# Patient Record
Sex: FEMALE | Race: WHITE | URBAN - METROPOLITAN AREA
[De-identification: names, ages, dates, MRNs, and addresses within clinical notes are randomized per-mention and may not be internally consistent; named-entity substitution may affect disease eponyms.]

---

## 2022-09-22 ENCOUNTER — OFFICE VISIT (OUTPATIENT)
Dept: OBGYN CLINIC | Facility: CLINIC | Age: 32
End: 2022-09-22

## 2022-09-22 VITALS — WEIGHT: 227 LBS | DIASTOLIC BLOOD PRESSURE: 72 MMHG | HEART RATE: 104 BPM | SYSTOLIC BLOOD PRESSURE: 116 MMHG

## 2022-09-22 DIAGNOSIS — Z31.69 PRE-CONCEPTION COUNSELING: ICD-10-CM

## 2022-09-22 DIAGNOSIS — Z01.419 WELL WOMAN EXAM WITH ROUTINE GYNECOLOGICAL EXAM: Primary | ICD-10-CM

## 2022-09-22 DIAGNOSIS — Z12.4 CERVICAL CANCER SCREENING: ICD-10-CM

## 2022-09-22 PROCEDURE — 3074F SYST BP LT 130 MM HG: CPT | Performed by: NURSE PRACTITIONER

## 2022-09-22 PROCEDURE — 3078F DIAST BP <80 MM HG: CPT | Performed by: NURSE PRACTITIONER

## 2022-09-22 PROCEDURE — 99385 PREV VISIT NEW AGE 18-39: CPT | Performed by: NURSE PRACTITIONER

## 2022-09-22 NOTE — PROGRESS NOTES
Here for new gynecology visit. 32year old G 0 P 0. No LMP recorded. Helio Muller Here for Annual Gynecologic Exam. Has been attempting conception since 2022. Menses Q 28 days for 4-5 days. Last pap smear was in 2018 and it was normal.  No hx of abnormal pap smears. OB Hx: neg. Family gyn hx: neg. Family breast hx:  neg. History reviewed. No pertinent past medical history. History reviewed. No pertinent surgical history. No current outpatient medications on file prior to visit. No current facility-administered medications on file prior to visit. OB History    Para Term  AB Living   0 0 0 0 0 0   SAB IAB Ectopic Multiple Live Births   0 0 0 0 0     ROS:    General:  No wt loss, wt gain, appetite changes. /72   Pulse 104   Wt 227 lb (103 kg)     NECK:  Thyroid normal size without nodules. No adenopathy. LUNGS:  Clear to auscultation. COR; Regular rate and rhythm. BREASTS:  symmetrical in shape. No masses, tenderness, secretions, or adenopathy. ABDOMEN:  Soft and non tender. No organomegaly. No inguinal adenopathy. VULVA: No lesions or erythema. VAGINA:  No lesions or abnormal discharge. CERVIX:  No lesions or CMT. Pap smear done with HPV. UTERUS:  anteflexed and normal size. ADNEXA:  No pain or masses. IMP/PLAN:   1. Well woman exam with routine gynecological exam  Regular self breast exams recommended    2. Pre-conception counseling  Advised continued timing of coitus with OPK  SO can obtain semen analysis any time  Call with positive HCG right away    3. Cervical cancer screening  - THINPREP TIS PAP AND HPV MRNA E6/E7 REFLEX HPV 16,18/45    See in 1 year/ prn.

## 2022-09-27 LAB — HPV MRNA E6/E7: NOT DETECTED

## 2022-12-08 ENCOUNTER — OFFICE VISIT (OUTPATIENT)
Dept: OBGYN CLINIC | Facility: CLINIC | Age: 32
End: 2022-12-08
Payer: COMMERCIAL

## 2022-12-08 ENCOUNTER — ULTRASOUND ENCOUNTER (OUTPATIENT)
Dept: OBGYN CLINIC | Facility: CLINIC | Age: 32
End: 2022-12-08
Payer: COMMERCIAL

## 2022-12-08 VITALS
SYSTOLIC BLOOD PRESSURE: 112 MMHG | HEIGHT: 71 IN | HEART RATE: 85 BPM | WEIGHT: 223.38 LBS | BODY MASS INDEX: 31.27 KG/M2 | DIASTOLIC BLOOD PRESSURE: 68 MMHG

## 2022-12-08 DIAGNOSIS — N91.2 AMENORRHEA: Primary | ICD-10-CM

## 2022-12-08 RX ORDER — DESOGESTREL AND ETHINYL ESTRADIOL 0.15-0.03
1 KIT ORAL DAILY
COMMUNITY
Start: 2021-08-18

## 2022-12-08 RX ORDER — BUSPIRONE HYDROCHLORIDE 10 MG/1
1 TABLET ORAL 2 TIMES DAILY PRN
COMMUNITY
Start: 2021-10-26

## 2022-12-08 NOTE — PROGRESS NOTES
Subjective:  Patient presents complaining of no menses. Positive home pregnancy test.  LMP 10/11/22. No family history of any inheritable diseases or congenital birth defects on either side of family. Objective:  /68   Pulse 85   Ht 71\"   Wt 223 lb 6.4 oz (101.3 kg)   LMP 10/11/2022     Physical Examination:  General appearance: Well dressed, well nourished in no apparent distress  Neurologic/Psychiatric: Alert and oriented to person, place and time, mood normal, affect appropriate    Summary of Ultrasound Findings:  LMP 10/11/22  8w2day by LMP;  1G1AFQ by ultrasound  Viable intrauterine pregnancy  Dates consistent with ultrasound. Final EDC:  7/18/22 by LMP consistent with ultrasound on 12/8/2022  Normal ovaries bilaterally     Assessment/Plan:  Amenorrhea- Normal dating ultrasound  Follow up 3-4* weeks for new OB visit. Prenatal vitamin with 0.4 mg folate, DHA. Optional prenatal screening tests reviewed including cfdna, carrier screen/CF, NT/first trimester screen, Quad/AFP, Level 2 ultrasound, amniocentesis/CVS.  Bleeding precautions provided. Diagnoses and all orders for this visit:    Amenorrhea  -     US PELVIS, ABDOMINAL GYNE EMG ONLY; Future         Return in about 4 weeks (around 1/5/2023) for New OB Visit.

## 2023-01-05 ENCOUNTER — INITIAL PRENATAL (OUTPATIENT)
Dept: OBGYN CLINIC | Facility: CLINIC | Age: 33
End: 2023-01-05
Payer: COMMERCIAL

## 2023-01-05 VITALS
WEIGHT: 220 LBS | SYSTOLIC BLOOD PRESSURE: 110 MMHG | DIASTOLIC BLOOD PRESSURE: 68 MMHG | BODY MASS INDEX: 30.8 KG/M2 | HEART RATE: 85 BPM | HEIGHT: 71 IN

## 2023-01-05 DIAGNOSIS — Z3A.12 12 WEEKS GESTATION OF PREGNANCY: ICD-10-CM

## 2023-01-05 DIAGNOSIS — Z34.01 ENCOUNTER FOR SUPERVISION OF NORMAL FIRST PREGNANCY IN FIRST TRIMESTER: Primary | ICD-10-CM

## 2023-01-05 PROBLEM — Z82.79 FAMILY HISTORY OF DOWNS SYNDROME: Status: ACTIVE | Noted: 2023-01-05

## 2023-01-05 LAB
GLUCOSE (URINE DIPSTICK): NEGATIVE MG/DL
MULTISTIX LOT#: NORMAL NUMERIC
PROTEIN (URINE DIPSTICK): NEGATIVE MG/DL

## 2023-01-05 PROCEDURE — 3074F SYST BP LT 130 MM HG: CPT | Performed by: NURSE PRACTITIONER

## 2023-01-05 PROCEDURE — 3008F BODY MASS INDEX DOCD: CPT | Performed by: NURSE PRACTITIONER

## 2023-01-05 PROCEDURE — 81002 URINALYSIS NONAUTO W/O SCOPE: CPT | Performed by: NURSE PRACTITIONER

## 2023-01-05 PROCEDURE — 3078F DIAST BP <80 MM HG: CPT | Performed by: NURSE PRACTITIONER

## 2023-01-05 NOTE — PROGRESS NOTES
Here for initial prenatal visit with our group. 28year old  at [facility] by LMP. Patient's last menstrual period was 10/11/2022. Last pap smear was 2022 and it was normal.     She has no medical history that poses risk for this pregnancy. Her great uncle had Down's Syndrome, she doesn't know maternal age of his mother. OB History    Para Term  AB Living   1             SAB IAB Ectopic Multiple Live Births                  # Outcome Date GA Lbr Delfin/2nd Weight Sex Delivery Anes PTL Lv   1 Current                ROS:  Reviewed, all wnl    Please see prenatal physical exam tab for initial OB physical exam  US: please see US tab      Prenatal course and care discussed with patient: visits, labs, HIV, AFP, sonograms, GL, GBS, restrictions. Pamphlets given. Genetic counseling done at prior visit regarding Cystic fibrosis and SMA carrier screen, First trimester screen/NT screen, CVS, QS + midtrimester sonogram for markers, amniocentesis. Pt plans to do the Quad screen. A/P:  1.  Encounter for supervision of normal first pregnancy in first trimester  - CHLAMYDIA/GONOCOCCUS, BERNARDINO  - OBSTETRIC PANEL WITH FOURTH GENERATION HIV    NICKI 4 weeks/ prn

## 2023-01-14 LAB
ABSOLUTE BASOPHILS: 13 CELLS/UL (ref 0–200)
ABSOLUTE EOSINOPHILS: 32 CELLS/UL (ref 15–500)
ABSOLUTE LYMPHOCYTES: 1663 CELLS/UL (ref 850–3900)
ABSOLUTE MONOCYTES: 410 CELLS/UL (ref 200–950)
ABSOLUTE NEUTROPHILS: 4183 CELLS/UL (ref 1500–7800)
BASOPHILS: 0.2 %
CHLAMYDIA TRACHOMATIS$RNA, TMA: NOT DETECTED
EOSINOPHILS: 0.5 %
HEMATOCRIT: 37.2 % (ref 35–45)
HEMOGLOBIN: 12.3 G/DL (ref 11.7–15.5)
LYMPHOCYTES: 26.4 %
MCH: 30.7 PG (ref 27–33)
MCHC: 33.1 G/DL (ref 32–36)
MCV: 92.8 FL (ref 80–100)
MONOCYTES: 6.5 %
MPV: 10.5 FL (ref 7.5–12.5)
NEISSERIA GONORRHOEAE$RNA, TMA: NOT DETECTED
NEUTROPHILS: 66.4 %
PLATELET COUNT: 272 THOUSAND/UL (ref 140–400)
RDW: 12.1 % (ref 11–15)
RED BLOOD CELL COUNT: 4.01 MILLION/UL (ref 3.8–5.1)
RUBELLA ANTIBODY (IGG): <0.9 INDEX
WHITE BLOOD CELL COUNT: 6.3 THOUSAND/UL (ref 3.8–10.8)

## 2023-01-16 PROBLEM — O09.899 RUBELLA NON-IMMUNE STATUS, ANTEPARTUM (HCC): Status: ACTIVE | Noted: 2023-01-16

## 2023-01-16 PROBLEM — O09.899 RUBELLA NON-IMMUNE STATUS, ANTEPARTUM: Status: ACTIVE | Noted: 2023-01-16

## 2023-01-16 PROBLEM — Z28.39 RUBELLA NON-IMMUNE STATUS, ANTEPARTUM (HCC): Status: ACTIVE | Noted: 2023-01-16

## 2023-01-16 PROBLEM — Z28.39 RUBELLA NON-IMMUNE STATUS, ANTEPARTUM: Status: ACTIVE | Noted: 2023-01-16

## 2023-02-09 ENCOUNTER — ROUTINE PRENATAL (OUTPATIENT)
Dept: OBGYN CLINIC | Facility: CLINIC | Age: 33
End: 2023-02-09
Payer: COMMERCIAL

## 2023-02-09 VITALS
WEIGHT: 222.38 LBS | BODY MASS INDEX: 31.13 KG/M2 | SYSTOLIC BLOOD PRESSURE: 108 MMHG | DIASTOLIC BLOOD PRESSURE: 70 MMHG | HEIGHT: 71 IN | HEART RATE: 110 BPM

## 2023-02-09 DIAGNOSIS — Z3A.17 17 WEEKS GESTATION OF PREGNANCY: Primary | ICD-10-CM

## 2023-02-09 DIAGNOSIS — Z34.00 SUPERVISION OF NORMAL FIRST PREGNANCY, ANTEPARTUM: ICD-10-CM

## 2023-02-09 PROCEDURE — 3008F BODY MASS INDEX DOCD: CPT | Performed by: NURSE PRACTITIONER

## 2023-02-09 PROCEDURE — 3078F DIAST BP <80 MM HG: CPT | Performed by: NURSE PRACTITIONER

## 2023-02-09 PROCEDURE — 81002 URINALYSIS NONAUTO W/O SCOPE: CPT | Performed by: NURSE PRACTITIONER

## 2023-02-09 PROCEDURE — 3074F SYST BP LT 130 MM HG: CPT | Performed by: NURSE PRACTITIONER

## 2023-02-09 NOTE — PROGRESS NOTES
NICKI  No FM yet  Doing well, some nose bleeds  No complaints.  No LOF/VB/uctx  Genetic testing- Quad screen ordered, to do Hep C with it  Anatomy Scan with NICKI at 20 weeks

## 2023-02-16 ENCOUNTER — TELEPHONE (OUTPATIENT)
Dept: OBGYN CLINIC | Facility: CLINIC | Age: 33
End: 2023-02-16

## 2023-02-16 NOTE — TELEPHONE ENCOUNTER
Patient called with lab orders needing tto be faxed to [facility] at [phone]. She said quad and HIV. She just said they missed one on the last lab.  Please advise    Patient has an appointment at 10:40 am

## 2023-02-17 LAB
AFP MOM: 1.16
AFP, SERUM: 41.8 NG/ML
CALC'D GESTATIONAL AGE: 18.3 WEEKS
CIGARETTE SMOKER: NO
DONOR EGG: NO
ESTRIOL MOM: 1.24
ESTRIOL, FREE: 1.65 NG/ML
HCG MOM: 2.07
HCG, SERUM: 38.46 IU/ML
HX OF NEURAL TUBE DEFECTS: NO
INHIBIN A MOM: 1.47
INHIBIN A, DIMERIC: 185 PG/ML
INSULIN DEPEND DIABETIC: NO
MATERNAL WEIGHT: 223 LBS
NUMBER OF FETUSES: 1
PREGNANCY RESULT OF IVF: NO
PREV PREGNANCY DOWN SYND: YES
REPEAT SPECIMEN: NO

## 2023-02-23 ENCOUNTER — TELEPHONE (OUTPATIENT)
Dept: OBGYN CLINIC | Facility: CLINIC | Age: 33
End: 2023-02-23

## 2023-02-23 NOTE — TELEPHONE ENCOUNTER
Contacted patient. She reports vomiting that started overnight. Thinks it is food poisoning. Has been able to keep clear liquids down this afternoon. No pregnancy concerns. Denies pain, LOF, vaginal bleeding. +FM  Reviewed management- encouraged clear liquids the rest of today. Instructed on BRAT diet and avoiding dairy. Patient states understanding and agrees with plan.

## 2023-03-03 ENCOUNTER — ULTRASOUND ENCOUNTER (OUTPATIENT)
Dept: OBGYN CLINIC | Facility: CLINIC | Age: 33
End: 2023-03-03
Payer: COMMERCIAL

## 2023-03-03 ENCOUNTER — ROUTINE PRENATAL (OUTPATIENT)
Dept: OBGYN CLINIC | Facility: CLINIC | Age: 33
End: 2023-03-03
Payer: COMMERCIAL

## 2023-03-03 VITALS
HEART RATE: 81 BPM | HEIGHT: 71 IN | BODY MASS INDEX: 30.8 KG/M2 | WEIGHT: 220 LBS | SYSTOLIC BLOOD PRESSURE: 108 MMHG | DIASTOLIC BLOOD PRESSURE: 70 MMHG

## 2023-03-03 DIAGNOSIS — Z34.01 ENCOUNTER FOR SUPERVISION OF NORMAL FIRST PREGNANCY IN FIRST TRIMESTER: ICD-10-CM

## 2023-03-03 DIAGNOSIS — Z3A.20 20 WEEKS GESTATION OF PREGNANCY: Primary | ICD-10-CM

## 2023-03-03 PROCEDURE — 3078F DIAST BP <80 MM HG: CPT | Performed by: OBSTETRICS & GYNECOLOGY

## 2023-03-03 PROCEDURE — 3074F SYST BP LT 130 MM HG: CPT | Performed by: OBSTETRICS & GYNECOLOGY

## 2023-03-03 PROCEDURE — 81002 URINALYSIS NONAUTO W/O SCOPE: CPT | Performed by: OBSTETRICS & GYNECOLOGY

## 2023-03-03 PROCEDURE — 3008F BODY MASS INDEX DOCD: CPT | Performed by: OBSTETRICS & GYNECOLOGY

## 2023-03-03 PROCEDURE — 76805 OB US >/= 14 WKS SNGL FETUS: CPT | Performed by: OBSTETRICS & GYNECOLOGY

## 2023-03-03 RX ORDER — PNV NO.95/FERROUS FUM/FOLIC AC 28MG-0.8MG
TABLET ORAL
COMMUNITY

## 2023-03-03 NOTE — PROGRESS NOTES
20w3d seen for routine OB visit. Denies ctx, lof, vb. Reports some subtle FM. Patient Active Problem List:     Encounter for supervision of normal first pregnancy in first trimester     Family history of Downs syndrome     Rubella non-immune status, antepartum       Gen: AAOx3, NAD  Resp: breathing comfortably  Abdomen: gravid, soft, nontender  Ext: nontender, no edema    US: EFW 469Z (60CS), cephalic, no anatomic abnormalities noted, , anterior placenta, normal cord insertion, [place] wnl, CL 4.1cm    Plan:  - reviewed normal anatomy US  - 2T labs ordered  - return precautions reviewed    RTO in 4 week(s).

## 2023-03-21 ENCOUNTER — TELEPHONE (OUTPATIENT)
Dept: OBGYN CLINIC | Facility: CLINIC | Age: 33
End: 2023-03-21

## 2023-03-21 NOTE — TELEPHONE ENCOUNTER
Received breast pump orders from [employer]. [facility] signed and I faxed back.  Copy in plfd

## 2023-03-22 NOTE — TELEPHONE ENCOUNTER
Received orders from [facility] for a backbrace. Dr. Alejandrina Kincaid signed and I faxed back.  Copy in plfd

## 2023-03-30 ENCOUNTER — ROUTINE PRENATAL (OUTPATIENT)
Dept: OBGYN CLINIC | Facility: CLINIC | Age: 33
End: 2023-03-30
Payer: COMMERCIAL

## 2023-03-30 VITALS — SYSTOLIC BLOOD PRESSURE: 112 MMHG | DIASTOLIC BLOOD PRESSURE: 66 MMHG | WEIGHT: 226.38 LBS | BODY MASS INDEX: 32 KG/M2

## 2023-03-30 DIAGNOSIS — Z3A.24 24 WEEKS GESTATION OF PREGNANCY: Primary | ICD-10-CM

## 2023-03-30 DIAGNOSIS — Z34.00 SUPERVISION OF NORMAL FIRST PREGNANCY, ANTEPARTUM: ICD-10-CM

## 2023-03-30 LAB
ABSOLUTE BASOPHILS: 21 CELLS/UL (ref 0–200)
ABSOLUTE EOSINOPHILS: 62 CELLS/UL (ref 15–500)
ABSOLUTE LYMPHOCYTES: 1778 CELLS/UL (ref 850–3900)
ABSOLUTE MONOCYTES: 582 CELLS/UL (ref 200–950)
ABSOLUTE NEUTROPHILS: 7956 CELLS/UL (ref 1500–7800)
BASOPHILS: 0.2 %
EOSINOPHILS: 0.6 %
GLUCOSE (URINE DIPSTICK): NEGATIVE MG/DL
GLUCOSE, GESTATIONAL SCREEN (50G)-130 CUTOFF: 84 MG/DL
HEMATOCRIT: 34.3 % (ref 35–45)
HEMOGLOBIN: 11.4 G/DL (ref 11.7–15.5)
LYMPHOCYTES: 17.1 %
MCH: 30.6 PG (ref 27–33)
MCHC: 33.2 G/DL (ref 32–36)
MCV: 92.2 FL (ref 80–100)
MONOCYTES: 5.6 %
MPV: 10.8 FL (ref 7.5–12.5)
MULTISTIX LOT#: NORMAL NUMERIC
NEUTROPHILS: 76.5 %
PLATELET COUNT: 254 THOUSAND/UL (ref 140–400)
PROTEIN (URINE DIPSTICK): NEGATIVE MG/DL
RDW: 12.4 % (ref 11–15)
RED BLOOD CELL COUNT: 3.72 MILLION/UL (ref 3.8–5.1)
WHITE BLOOD CELL COUNT: 10.4 THOUSAND/UL (ref 3.8–10.8)

## 2023-03-30 PROCEDURE — 81002 URINALYSIS NONAUTO W/O SCOPE: CPT | Performed by: NURSE PRACTITIONER

## 2023-03-30 PROCEDURE — 3078F DIAST BP <80 MM HG: CPT | Performed by: NURSE PRACTITIONER

## 2023-03-30 PROCEDURE — 3074F SYST BP LT 130 MM HG: CPT | Performed by: NURSE PRACTITIONER

## 2023-03-30 NOTE — PROGRESS NOTES
NICKI  Doing well, had a spotting episode that was vaginal last week. It seemed like a lot of blood with wiping but resolved immediately. Advised on stool softeners and fiber/ water. Call with any reoccurrence.    FM is good  RH positive  1 hr glucose was normal  TDAP recommended   RTC 4wks

## 2023-04-28 ENCOUNTER — ROUTINE PRENATAL (OUTPATIENT)
Dept: OBGYN CLINIC | Facility: CLINIC | Age: 33
End: 2023-04-28
Payer: COMMERCIAL

## 2023-04-28 VITALS — SYSTOLIC BLOOD PRESSURE: 104 MMHG | DIASTOLIC BLOOD PRESSURE: 68 MMHG | WEIGHT: 232.25 LBS | BODY MASS INDEX: 32 KG/M2

## 2023-04-28 DIAGNOSIS — Z3A.28 28 WEEKS GESTATION OF PREGNANCY: Primary | ICD-10-CM

## 2023-04-28 DIAGNOSIS — Z34.00 SUPERVISION OF NORMAL FIRST PREGNANCY, ANTEPARTUM: ICD-10-CM

## 2023-04-28 PROCEDURE — 3074F SYST BP LT 130 MM HG: CPT | Performed by: OBSTETRICS & GYNECOLOGY

## 2023-04-28 PROCEDURE — 90471 IMMUNIZATION ADMIN: CPT | Performed by: OBSTETRICS & GYNECOLOGY

## 2023-04-28 PROCEDURE — 81002 URINALYSIS NONAUTO W/O SCOPE: CPT | Performed by: OBSTETRICS & GYNECOLOGY

## 2023-04-28 PROCEDURE — 90715 TDAP VACCINE 7 YRS/> IM: CPT | Performed by: OBSTETRICS & GYNECOLOGY

## 2023-04-28 PROCEDURE — 3078F DIAST BP <80 MM HG: CPT | Performed by: OBSTETRICS & GYNECOLOGY

## 2023-04-28 NOTE — PROGRESS NOTES
NICKI 28w3d    Doing well, +FM  no contractions  no LOF, VB    1. FHT-present  2. PNL:  HIV ordered  3. Mode of delivery:  anticipated   4. Immunizations: TDAP she accepts today.    5. Reviewed birth classes and picking peds    Return in 2 weeks

## 2023-04-28 NOTE — PATIENT INSTRUCTIONS
[employer] [employer] has had significant outbreaks of pertussis (whooping cough) for the last few years. Therefore, the CDC recommends that all pregnant women receive a Tdap vaccine during pregnancy, regardless of whether you have received one previously. The vaccine reduces your chances of ramon the illness, and also provides some natural immunity to your baby for possible exposures after birth. The best time to get the vaccine is between 27 and 36 weeks. Baby caregivers (grandparents, spouse) should also receive the vaccine. Influenza- Pregnant women who develop the flu are more prone to serious complications that can affect both mother and baby. The CDC recommends that all women who will be pregnant during flu season (October to May) receive the flu vaccine (injection only, not nasal spray). The vaccine can be given during any stage of pregnancy. Both vaccines are offered in our office, as well as through many pharmacies and primary care offices. Pregnancy/Nursing and Covid-19 Vaccine   As the [facility]  begins to make progress in administering the Covid-19 vaccine, we understand that our pregnant and breastfeeding patients will have questions about the safety of the Covid-19 vaccine. Keep in mind that information is evolving rapidly and that recommendations can change over time. The CDC, the Society for Maternal Fetal Medicine, and the [facility] of [facility]stetrics & Gynecology now recommend that all pregnant and breastfeeding women consider getting the Covid-19 vaccine, in consultation with their healthcare provider. Factors to consider include the available safety data, risks to pregnant women from Covid-19 infection, and your individual risk for infection and severe disease, based on your risk of exposure and any other medical risk factors that you may have.   A recent study of 36,000 pregnant women confirmed the safety and effectiveness of the vaccine, with no increase risk of miscarriage. Here are some facts to consider when you are making a decision:   [1] Pregnant women are at higher risk for acquiring Covid-19 infection and have a subsequent higher risk for the following:  - severe illness  - adverse pregnancy outcomes including  birth  - hospitalization, ICU admission, need for mechanical ventilation and death  [2] The mRNA vaccines that are currently available do not contain live virus, do not enter the nucleus, do not cause genetic changes, do not alter human DNA, and cannot cause Covid-19 illness. [3] mRNA breaks down quickly, so it's unlikely that any of the mRNA in the vaccine would be able to get into your breast milk or into your baby through the placenta. [4] The antibodies that your body produces in response to the vaccine will be passed to your baby through the placenta and provide some protection for your  baby against Covid-19 infection. [5] Whether you ultimately decide to receive the vaccine or not, do not forget to continue with other preventative measures including frequent hand washing, avoiding crowds, physical distancing and wearing a mask.

## 2023-04-28 NOTE — PROGRESS NOTES
Patient name, , allergies, immunization, dose, and route of administration verified prior to administration. Patient consents to administration and tolerated injection well. Patient left office in good condition.

## 2023-05-12 ENCOUNTER — ROUTINE PRENATAL (OUTPATIENT)
Dept: OBGYN CLINIC | Facility: CLINIC | Age: 33
End: 2023-05-12
Payer: COMMERCIAL

## 2023-05-12 VITALS — SYSTOLIC BLOOD PRESSURE: 106 MMHG | DIASTOLIC BLOOD PRESSURE: 74 MMHG | BODY MASS INDEX: 33 KG/M2 | WEIGHT: 236.25 LBS

## 2023-05-12 DIAGNOSIS — Z3A.30 30 WEEKS GESTATION OF PREGNANCY: Primary | ICD-10-CM

## 2023-05-12 PROCEDURE — 81002 URINALYSIS NONAUTO W/O SCOPE: CPT | Performed by: OBSTETRICS & GYNECOLOGY

## 2023-05-12 PROCEDURE — 3074F SYST BP LT 130 MM HG: CPT | Performed by: OBSTETRICS & GYNECOLOGY

## 2023-05-12 PROCEDURE — 3078F DIAST BP <80 MM HG: CPT | Performed by: OBSTETRICS & GYNECOLOGY

## 2023-05-12 NOTE — PROGRESS NOTES
30w3d seen for routine OB visit. Denies ctx, lof, vb. Reports good FM. Patient Active Problem List:     Supervision of normal first pregnancy, antepartum     Family history of Downs syndrome     Rubella non-immune status, antepartum       Gen: AAOx3, NAD  Resp: breathing comfortably  Abdomen: gravid, soft, nontender  Ext: nontender, no edema    Plan:  - s/p tdap  - EPDS 0  - reminded to complete 3T HIV  - return precautions reviewed    RTO in 2 week(s).

## 2023-05-26 ENCOUNTER — ROUTINE PRENATAL (OUTPATIENT)
Dept: OBGYN CLINIC | Facility: CLINIC | Age: 33
End: 2023-05-26
Payer: COMMERCIAL

## 2023-05-26 VITALS — BODY MASS INDEX: 33 KG/M2 | DIASTOLIC BLOOD PRESSURE: 74 MMHG | WEIGHT: 239.38 LBS | SYSTOLIC BLOOD PRESSURE: 112 MMHG

## 2023-05-26 DIAGNOSIS — Z34.00 SUPERVISION OF NORMAL FIRST PREGNANCY, ANTEPARTUM: Primary | ICD-10-CM

## 2023-05-26 DIAGNOSIS — Z82.79 FAMILY HISTORY OF DOWNS SYNDROME: ICD-10-CM

## 2023-05-26 DIAGNOSIS — O09.899 RUBELLA NON-IMMUNE STATUS, ANTEPARTUM: ICD-10-CM

## 2023-05-26 DIAGNOSIS — Z3A.32 32 WEEKS GESTATION OF PREGNANCY: ICD-10-CM

## 2023-05-26 DIAGNOSIS — Z28.39 RUBELLA NON-IMMUNE STATUS, ANTEPARTUM: ICD-10-CM

## 2023-05-26 NOTE — PROGRESS NOTES
NICKI    GA: 32w3d   23  1109   BP: 112/74   Weight: 239 lb 6 oz (108.6 kg)       Doing well, +FM  Denies LOF/VB/uctx  Rh +, TDAP received, EPDS 0  PTL and Fetal movement instructions given   Needs to complete repeat HIV     Problem List Items Addressed This Visit        Gravid and     Supervision of normal first pregnancy, antepartum - Primary    Rubella non-immune status, antepartum       Chromosomal and Congenital    Family history of Downs syndrome   Other Visit Diagnoses     32 weeks gestation of pregnancy        Relevant Orders    URINALYSIS NONAUTO W/O SCOPE (OB URISTIX) (Completed)          RTC in 2 wks      Note to patient and family   The [facility] makes medical notes available to patients in the interest of transparency. However, please be advised that this is a medical document. It is intended as zody-tr-xiwh communication. It is written and medical language may contain abbreviations or verbiage that are technical and unfamiliar. It may appear blunt or direct. Medical documents are intended to carry relevant information, facts as evident, and the clinical opinion of the practitioner.

## 2023-06-08 ENCOUNTER — TELEPHONE (OUTPATIENT)
Dept: OBGYN CLINIC | Facility: CLINIC | Age: 33
End: 2023-06-08

## 2023-06-08 ENCOUNTER — ROUTINE PRENATAL (OUTPATIENT)
Dept: OBGYN CLINIC | Facility: CLINIC | Age: 33
End: 2023-06-08
Payer: COMMERCIAL

## 2023-06-08 VITALS
SYSTOLIC BLOOD PRESSURE: 118 MMHG | DIASTOLIC BLOOD PRESSURE: 80 MMHG | WEIGHT: 247.19 LBS | HEIGHT: 70 IN | BODY MASS INDEX: 35.39 KG/M2

## 2023-06-08 DIAGNOSIS — Z34.00 SUPERVISION OF NORMAL FIRST PREGNANCY, ANTEPARTUM: Primary | ICD-10-CM

## 2023-06-08 DIAGNOSIS — Z23 NEED FOR VACCINATION: ICD-10-CM

## 2023-06-08 PROCEDURE — 81002 URINALYSIS NONAUTO W/O SCOPE: CPT | Performed by: NURSE PRACTITIONER

## 2023-06-08 PROCEDURE — 3079F DIAST BP 80-89 MM HG: CPT | Performed by: NURSE PRACTITIONER

## 2023-06-08 PROCEDURE — 3074F SYST BP LT 130 MM HG: CPT | Performed by: NURSE PRACTITIONER

## 2023-06-08 PROCEDURE — 3008F BODY MASS INDEX DOCD: CPT | Performed by: NURSE PRACTITIONER

## 2023-06-08 NOTE — PROGRESS NOTES
NICKI 34w2d    Doing well, +FM  Denies contractions  Denies LOF, VB      1. FHT-P  2. PNL:  HIV negative  3. Anticipatory guidance on SVE and GBS at next visit  4. Mode of delivery: anticipate    5. Immunizations: s/p TDAP  6.  labor precautions reviewed  7.  Fetal movement expectations discussed    Return in 2 weeks

## 2023-06-08 NOTE — TELEPHONE ENCOUNTER
Pt dropped off [facility] paperwork, has been paid. Faxing to kylie, original in [place].

## 2023-06-09 ENCOUNTER — MED REC SCAN ONLY (OUTPATIENT)
Dept: OBGYN CLINIC | Facility: CLINIC | Age: 33
End: 2023-06-09

## 2023-06-09 NOTE — TELEPHONE ENCOUNTER
Paperwork compelted. Lizet Francois signed. Copy sent to scanning. Faxed to number on form. Copy filed in [address]. Sent pt [facility].

## 2023-06-20 ENCOUNTER — ROUTINE PRENATAL (OUTPATIENT)
Dept: OBGYN CLINIC | Facility: CLINIC | Age: 33
End: 2023-06-20
Payer: COMMERCIAL

## 2023-06-20 VITALS
BODY MASS INDEX: 36 KG/M2 | HEART RATE: 97 BPM | DIASTOLIC BLOOD PRESSURE: 78 MMHG | WEIGHT: 251.38 LBS | SYSTOLIC BLOOD PRESSURE: 124 MMHG

## 2023-06-20 DIAGNOSIS — Z36.85 ANTENATAL SCREENING FOR STREPTOCOCCUS B: ICD-10-CM

## 2023-06-20 DIAGNOSIS — Z3A.36 36 WEEKS GESTATION OF PREGNANCY: Primary | ICD-10-CM

## 2023-06-20 PROCEDURE — 3074F SYST BP LT 130 MM HG: CPT | Performed by: NURSE PRACTITIONER

## 2023-06-20 PROCEDURE — 3078F DIAST BP <80 MM HG: CPT | Performed by: NURSE PRACTITIONER

## 2023-06-20 PROCEDURE — 81002 URINALYSIS NONAUTO W/O SCOPE: CPT | Performed by: NURSE PRACTITIONER

## 2023-06-20 NOTE — PROGRESS NOTES
NICKI 36w0d    Doing well, +FM  Denies contractions  Denies LOF, VB      1. FHT-P  2. PNL:  GBS today  3. SVE: /3  4. Mode of delivery: anticipate    5. Immunizations: s/p TDAP  6. Labor precautions reviewed  7.  Fetal movement expectations discussed    Return in 1 weeks

## 2023-06-28 ENCOUNTER — ROUTINE PRENATAL (OUTPATIENT)
Dept: OBGYN CLINIC | Facility: CLINIC | Age: 33
End: 2023-06-28
Payer: COMMERCIAL

## 2023-06-28 VITALS
DIASTOLIC BLOOD PRESSURE: 72 MMHG | WEIGHT: 251.5 LBS | HEART RATE: 94 BPM | HEIGHT: 70 IN | BODY MASS INDEX: 36.01 KG/M2 | SYSTOLIC BLOOD PRESSURE: 116 MMHG

## 2023-06-28 DIAGNOSIS — Z3A.37 37 WEEKS GESTATION OF PREGNANCY: Primary | ICD-10-CM

## 2023-06-28 PROCEDURE — 3008F BODY MASS INDEX DOCD: CPT | Performed by: OBSTETRICS & GYNECOLOGY

## 2023-06-28 PROCEDURE — 81002 URINALYSIS NONAUTO W/O SCOPE: CPT | Performed by: OBSTETRICS & GYNECOLOGY

## 2023-06-28 PROCEDURE — 3074F SYST BP LT 130 MM HG: CPT | Performed by: OBSTETRICS & GYNECOLOGY

## 2023-06-28 PROCEDURE — 3078F DIAST BP <80 MM HG: CPT | Performed by: OBSTETRICS & GYNECOLOGY

## 2023-07-06 ENCOUNTER — ROUTINE PRENATAL (OUTPATIENT)
Dept: OBGYN CLINIC | Facility: CLINIC | Age: 33
End: 2023-07-06
Payer: COMMERCIAL

## 2023-07-06 VITALS
SYSTOLIC BLOOD PRESSURE: 122 MMHG | BODY MASS INDEX: 37 KG/M2 | HEART RATE: 92 BPM | WEIGHT: 255.38 LBS | DIASTOLIC BLOOD PRESSURE: 76 MMHG

## 2023-07-06 DIAGNOSIS — Z34.90 PRENATAL CARE, ANTEPARTUM: Primary | ICD-10-CM

## 2023-07-06 LAB
GLUCOSE (URINE DIPSTICK): NEGATIVE MG/DL
MULTISTIX LOT#: NORMAL NUMERIC

## 2023-07-06 PROCEDURE — 3074F SYST BP LT 130 MM HG: CPT | Performed by: NURSE PRACTITIONER

## 2023-07-06 PROCEDURE — 81002 URINALYSIS NONAUTO W/O SCOPE: CPT | Performed by: NURSE PRACTITIONER

## 2023-07-06 PROCEDURE — 3078F DIAST BP <80 MM HG: CPT | Performed by: NURSE PRACTITIONER

## 2023-07-06 NOTE — PROGRESS NOTES
NICKI 38w2d    Doing well, +FM  Denies contractions  Denies LOF, VB      FHT-P  PNL:  GBS negative  Mode of delivery: anticipate    Labor precautions reviewed  Fetal movement expectations discussed    Return in 1 weeks

## 2023-07-10 ENCOUNTER — ROUTINE PRENATAL (OUTPATIENT)
Dept: OBGYN CLINIC | Facility: CLINIC | Age: 33
End: 2023-07-10
Payer: COMMERCIAL

## 2023-07-10 ENCOUNTER — HOSPITAL ENCOUNTER (INPATIENT)
Facility: HOSPITAL | Age: 33
LOS: 3 days | Discharge: HOME | End: 2023-07-13
Attending: OBSTETRICS & GYNECOLOGY | Admitting: OBSTETRICS & GYNECOLOGY
Payer: COMMERCIAL

## 2023-07-10 ENCOUNTER — APPOINTMENT (OUTPATIENT)
Dept: OBGYN CLINIC | Facility: HOSPITAL | Age: 33
End: 2023-07-10
Payer: COMMERCIAL

## 2023-07-10 ENCOUNTER — HOSPITAL ENCOUNTER (OUTPATIENT)
Facility: HOSPITAL | Age: 33
Discharge: HOME | End: 2023-07-10
Attending: OBSTETRICS & GYNECOLOGY | Admitting: OBSTETRICS & GYNECOLOGY
Payer: COMMERCIAL

## 2023-07-10 VITALS
SYSTOLIC BLOOD PRESSURE: 130 MMHG | DIASTOLIC BLOOD PRESSURE: 86 MMHG | BODY MASS INDEX: 37 KG/M2 | HEART RATE: 133 BPM | WEIGHT: 255.38 LBS

## 2023-07-10 VITALS
WEIGHT: 255.38 LBS | HEART RATE: 96 BPM | HEIGHT: 70 IN | BODY MASS INDEX: 36.56 KG/M2 | RESPIRATION RATE: 20 BRPM | SYSTOLIC BLOOD PRESSURE: 143 MMHG | DIASTOLIC BLOOD PRESSURE: 86 MMHG

## 2023-07-10 DIAGNOSIS — Z28.39 RUBELLA NON-IMMUNE STATUS, ANTEPARTUM: ICD-10-CM

## 2023-07-10 DIAGNOSIS — O09.899 RUBELLA NON-IMMUNE STATUS, ANTEPARTUM: ICD-10-CM

## 2023-07-10 DIAGNOSIS — Z82.79 FAMILY HISTORY OF DOWNS SYNDROME: ICD-10-CM

## 2023-07-10 DIAGNOSIS — Z3A.38 38 WEEKS GESTATION OF PREGNANCY: ICD-10-CM

## 2023-07-10 DIAGNOSIS — Z34.00 SUPERVISION OF NORMAL FIRST PREGNANCY, ANTEPARTUM: Primary | ICD-10-CM

## 2023-07-10 PROBLEM — Z34.90 PREGNANCY: Status: ACTIVE | Noted: 2023-07-10

## 2023-07-10 PROBLEM — Z34.90 PREGNANCY (HCC): Status: ACTIVE | Noted: 2023-07-10

## 2023-07-10 LAB
ALBUMIN SERPL-MCNC: 2.5 G/DL (ref 3.4–5)
ALBUMIN/GLOB SERPL: 0.6 {RATIO} (ref 1–2)
ALP LIVER SERPL-CCNC: 189 U/L
ALT SERPL-CCNC: 12 U/L
ANION GAP SERPL CALC-SCNC: 7 MMOL/L (ref 0–18)
ANTIBODY SCREEN: NEGATIVE
AST SERPL-CCNC: 17 U/L (ref 15–37)
BASOPHILS # BLD AUTO: 0.02 X10(3) UL (ref 0–0.2)
BASOPHILS # BLD AUTO: 0.03 X10(3) UL (ref 0–0.2)
BASOPHILS NFR BLD AUTO: 0.2 %
BASOPHILS NFR BLD AUTO: 0.3 %
BILIRUB SERPL-MCNC: 0.2 MG/DL (ref 0.1–2)
BUN BLD-MCNC: 12 MG/DL (ref 7–18)
CALCIUM BLD-MCNC: 9.4 MG/DL (ref 8.5–10.1)
CHLORIDE SERPL-SCNC: 109 MMOL/L (ref 98–112)
CO2 SERPL-SCNC: 20 MMOL/L (ref 21–32)
CREAT BLD-MCNC: 0.76 MG/DL
CREAT UR-SCNC: 82.7 MG/DL
EOSINOPHIL # BLD AUTO: 0.03 X10(3) UL (ref 0–0.7)
EOSINOPHIL # BLD AUTO: 0.04 X10(3) UL (ref 0–0.7)
EOSINOPHIL NFR BLD AUTO: 0.3 %
EOSINOPHIL NFR BLD AUTO: 0.4 %
ERYTHROCYTE [DISTWIDTH] IN BLOOD BY AUTOMATED COUNT: 14.6 %
ERYTHROCYTE [DISTWIDTH] IN BLOOD BY AUTOMATED COUNT: 14.6 %
FASTING STATUS PATIENT QL REPORTED: NO
GFR SERPLBLD BASED ON 1.73 SQ M-ARVRAT: 107 ML/MIN/1.73M2 (ref 60–?)
GLOBULIN PLAS-MCNC: 4.2 G/DL (ref 2.8–4.4)
GLUCOSE (URINE DIPSTICK): NEGATIVE MG/DL
GLUCOSE BLD-MCNC: 91 MG/DL (ref 70–99)
HCT VFR BLD AUTO: 35.1 %
HCT VFR BLD AUTO: 35.7 %
HGB BLD-MCNC: 11.6 G/DL
HGB BLD-MCNC: 12 G/DL
IMM GRANULOCYTES # BLD AUTO: 0.07 X10(3) UL (ref 0–1)
IMM GRANULOCYTES # BLD AUTO: 0.25 X10(3) UL (ref 0–1)
IMM GRANULOCYTES NFR BLD: 0.7 %
IMM GRANULOCYTES NFR BLD: 2.7 %
LYMPHOCYTES # BLD AUTO: 2.02 X10(3) UL (ref 1–4)
LYMPHOCYTES # BLD AUTO: 2.27 X10(3) UL (ref 1–4)
LYMPHOCYTES NFR BLD AUTO: 21.7 %
LYMPHOCYTES NFR BLD AUTO: 23.6 %
MCH RBC QN AUTO: 28.9 PG (ref 26–34)
MCH RBC QN AUTO: 30.5 PG (ref 26–34)
MCHC RBC AUTO-ENTMCNC: 32.5 G/DL (ref 31–37)
MCHC RBC AUTO-ENTMCNC: 34.2 G/DL (ref 31–37)
MCV RBC AUTO: 89 FL
MCV RBC AUTO: 89.1 FL
MONOCYTES # BLD AUTO: 0.6 X10(3) UL (ref 0.1–1)
MONOCYTES # BLD AUTO: 0.69 X10(3) UL (ref 0.1–1)
MONOCYTES NFR BLD AUTO: 6.5 %
MONOCYTES NFR BLD AUTO: 7.2 %
MULTISTIX LOT#: NORMAL NUMERIC
NEUTROPHILS # BLD AUTO: 6.36 X10 (3) UL (ref 1.5–7.7)
NEUTROPHILS # BLD AUTO: 6.36 X10(3) UL (ref 1.5–7.7)
NEUTROPHILS # BLD AUTO: 6.55 X10 (3) UL (ref 1.5–7.7)
NEUTROPHILS # BLD AUTO: 6.55 X10(3) UL (ref 1.5–7.7)
NEUTROPHILS NFR BLD AUTO: 68 %
NEUTROPHILS NFR BLD AUTO: 68.4 %
OSMOLALITY SERPL CALC.SUM OF ELEC: 281 MOSM/KG (ref 275–295)
PLATELET # BLD AUTO: 237 10(3)UL (ref 150–450)
PLATELET # BLD AUTO: 253 10(3)UL (ref 150–450)
POTASSIUM SERPL-SCNC: 4.3 MMOL/L (ref 3.5–5.1)
PROT SERPL-MCNC: 6.7 G/DL (ref 6.4–8.2)
PROT UR-MCNC: 14.1 MG/DL
PROT/CREAT UR-RTO: 0.17
PROTEIN (URINE DIPSTICK): NEGATIVE MG/DL
RBC # BLD AUTO: 3.94 X10(6)UL
RBC # BLD AUTO: 4.01 X10(6)UL
RH BLOOD TYPE: POSITIVE
SODIUM SERPL-SCNC: 136 MMOL/L (ref 136–145)
T PALLIDUM AB SER QL IA: NONREACTIVE
URATE SERPL-MCNC: 6.9 MG/DL
WBC # BLD AUTO: 9.3 X10(3) UL (ref 4–11)
WBC # BLD AUTO: 9.6 X10(3) UL (ref 4–11)

## 2023-07-10 PROCEDURE — 3E0P7VZ INTRODUCTION OF HORMONE INTO FEMALE REPRODUCTIVE, VIA NATURAL OR ARTIFICIAL OPENING: ICD-10-PCS | Performed by: OBSTETRICS & GYNECOLOGY

## 2023-07-10 PROCEDURE — 84550 ASSAY OF BLOOD/URIC ACID: CPT | Performed by: OBSTETRICS & GYNECOLOGY

## 2023-07-10 PROCEDURE — 59025 FETAL NON-STRESS TEST: CPT

## 2023-07-10 PROCEDURE — 36415 COLL VENOUS BLD VENIPUNCTURE: CPT

## 2023-07-10 PROCEDURE — 85025 COMPLETE CBC W/AUTO DIFF WBC: CPT | Performed by: OBSTETRICS & GYNECOLOGY

## 2023-07-10 PROCEDURE — 99203 OFFICE O/P NEW LOW 30 MIN: CPT

## 2023-07-10 PROCEDURE — 82570 ASSAY OF URINE CREATININE: CPT | Performed by: OBSTETRICS & GYNECOLOGY

## 2023-07-10 PROCEDURE — 84156 ASSAY OF PROTEIN URINE: CPT | Performed by: OBSTETRICS & GYNECOLOGY

## 2023-07-10 PROCEDURE — 80053 COMPREHEN METABOLIC PANEL: CPT | Performed by: OBSTETRICS & GYNECOLOGY

## 2023-07-10 RX ORDER — IBUPROFEN 600 MG/1
600 TABLET ORAL ONCE AS NEEDED
Status: COMPLETED | OUTPATIENT
Start: 2023-07-10 | End: 2023-07-11

## 2023-07-10 RX ORDER — SODIUM CHLORIDE, SODIUM LACTATE, POTASSIUM CHLORIDE, CALCIUM CHLORIDE 600; 310; 30; 20 MG/100ML; MG/100ML; MG/100ML; MG/100ML
INJECTION, SOLUTION INTRAVENOUS CONTINUOUS
Status: DISCONTINUED | OUTPATIENT
Start: 2023-07-10 | End: 2023-07-11 | Stop reason: HOSPADM

## 2023-07-10 RX ORDER — HYDROMORPHONE HYDROCHLORIDE 1 MG/ML
1 INJECTION, SOLUTION INTRAMUSCULAR; INTRAVENOUS; SUBCUTANEOUS EVERY 2 HOUR PRN
Status: DISCONTINUED | OUTPATIENT
Start: 2023-07-10 | End: 2023-07-11 | Stop reason: HOSPADM

## 2023-07-10 RX ORDER — DEXTROSE, SODIUM CHLORIDE, SODIUM LACTATE, POTASSIUM CHLORIDE, AND CALCIUM CHLORIDE 5; .6; .31; .03; .02 G/100ML; G/100ML; G/100ML; G/100ML; G/100ML
INJECTION, SOLUTION INTRAVENOUS AS NEEDED
Status: DISCONTINUED | OUTPATIENT
Start: 2023-07-10 | End: 2023-07-11 | Stop reason: HOSPADM

## 2023-07-10 RX ORDER — ACETAMINOPHEN 500 MG
500 TABLET ORAL EVERY 6 HOURS PRN
Status: DISCONTINUED | OUTPATIENT
Start: 2023-07-10 | End: 2023-07-11 | Stop reason: HOSPADM

## 2023-07-10 RX ORDER — TRISODIUM CITRATE DIHYDRATE AND CITRIC ACID MONOHYDRATE 500; 334 MG/5ML; MG/5ML
30 SOLUTION ORAL AS NEEDED
Status: DISCONTINUED | OUTPATIENT
Start: 2023-07-10 | End: 2023-07-11 | Stop reason: HOSPADM

## 2023-07-10 RX ORDER — ACETAMINOPHEN 500 MG
1000 TABLET ORAL EVERY 6 HOURS PRN
Status: DISCONTINUED | OUTPATIENT
Start: 2023-07-10 | End: 2023-07-11 | Stop reason: HOSPADM

## 2023-07-10 RX ORDER — ONDANSETRON 2 MG/ML
4 INJECTION INTRAMUSCULAR; INTRAVENOUS EVERY 6 HOURS PRN
Status: DISCONTINUED | OUTPATIENT
Start: 2023-07-10 | End: 2023-07-11 | Stop reason: HOSPADM

## 2023-07-10 RX ORDER — TERBUTALINE SULFATE 1 MG/ML
0.25 INJECTION, SOLUTION SUBCUTANEOUS AS NEEDED
Status: DISCONTINUED | OUTPATIENT
Start: 2023-07-10 | End: 2023-07-11 | Stop reason: HOSPADM

## 2023-07-10 NOTE — DISCHARGE INSTRUCTIONS
Discharge Instructions    Diet: Regular  Activity: Normal activity         General Instructions    Call your [facility] doctor if: Fluid leaking from your vagina;Uterine contractions increasing in intensity and frequency;Vaginal bleeding;Vaginal or rectal pressure;Decrease in fetal movement; Temperature greater than 100F

## 2023-07-10 NOTE — PROGRESS NOTES
Discharged to home per ambulatory in stable condition not in active labor with written and verbal instructions. Patient verbalizes understanding of information given. Pt will return this evening for scheduled induction.

## 2023-07-10 NOTE — NST
Nonstress Test   Patient: Nikolai Machado    Gestation: 38w6d    NST:       Variability: Moderate           Accelerations: Yes           Decelerations: None            Baseline: 135 BPM                       Contractions: Irregular                                        Acoustic Stimulator: No           Nonstress Test Interpretation: Reactive           Nonstress Test Second Interpretation: Reactive                     Additional Comments

## 2023-07-10 NOTE — PROGRESS NOTES
NICKI    GA: 38w6d   07/10/23  1304 07/10/23  1315   BP: (!) 132/96 130/86   Pulse: (!) 133    Weight: 255 lb 6 oz (115.8 kg)        Doing well, +FM  Denies LOF/VB/uctx  Mode of delivery:  anticipated  SVE deferred    GBS neg   Fetal movement count given  Labor precautions provided   Elevated blood pressure, repeat normal.  However, patient had an episode of epigastric pain last night thought to be related to acid reflux. +2 BLE. Denies nausea, vomiting, epigastric pain/right upper quadrant abdominal pain, vision changes and headache today. Recommend to report to triage for further evaluation including serial blood pressure monitoring and laboratory evaluation. Patient agreeable. Preeclampsia precautions provided. Problem List Items Addressed This Visit          Gravid and     Supervision of normal first pregnancy, antepartum - Primary    Rubella non-immune status, antepartum       Chromosomal and Congenital    Family history of Downs syndrome     Other Visit Diagnoses       38 weeks gestation of pregnancy        Relevant Orders    URINALYSIS NONAUTO W/O SCOPE (OB URISTIX) (Completed)              RTC 1 week      Note to patient and family   The [facility] makes medical notes available to patients in the interest of transparency. However, please be advised that this is a medical document. It is intended as opqb-wb-tgsu communication. It is written and medical language may contain abbreviations or verbiage that are technical and unfamiliar. It may appear blunt or direct. Medical documents are intended to carry relevant information, facts as evident, and the clinical opinion of the practitioner.

## 2023-07-11 ENCOUNTER — ANESTHESIA EVENT (OUTPATIENT)
Dept: OBGYN CLINIC | Facility: HOSPITAL | Age: 33
End: 2023-07-11
Payer: COMMERCIAL

## 2023-07-11 ENCOUNTER — ANESTHESIA (OUTPATIENT)
Dept: OBGYN CLINIC | Facility: HOSPITAL | Age: 33
End: 2023-07-11
Payer: COMMERCIAL

## 2023-07-11 LAB
ALBUMIN SERPL-MCNC: 1.9 G/DL (ref 3.4–5)
ALBUMIN/GLOB SERPL: 0.6 {RATIO} (ref 1–2)
ALP LIVER SERPL-CCNC: 140 U/L
ALT SERPL-CCNC: 10 U/L
ANION GAP SERPL CALC-SCNC: 6 MMOL/L (ref 0–18)
AST SERPL-CCNC: 27 U/L (ref 15–37)
BASOPHILS # BLD AUTO: 0.02 X10(3) UL (ref 0–0.2)
BASOPHILS NFR BLD AUTO: 0.1 %
BILIRUB SERPL-MCNC: 0.2 MG/DL (ref 0.1–2)
BUN BLD-MCNC: 12 MG/DL (ref 7–18)
CALCIUM BLD-MCNC: 8.4 MG/DL (ref 8.5–10.1)
CHLORIDE SERPL-SCNC: 107 MMOL/L (ref 98–112)
CO2 SERPL-SCNC: 22 MMOL/L (ref 21–32)
CREAT BLD-MCNC: 0.88 MG/DL
EOSINOPHIL # BLD AUTO: 0 X10(3) UL (ref 0–0.7)
EOSINOPHIL NFR BLD AUTO: 0 %
ERYTHROCYTE [DISTWIDTH] IN BLOOD BY AUTOMATED COUNT: 14.6 %
GFR SERPLBLD BASED ON 1.73 SQ M-ARVRAT: 89 ML/MIN/1.73M2 (ref 60–?)
GLOBULIN PLAS-MCNC: 3.4 G/DL (ref 2.8–4.4)
GLUCOSE BLD-MCNC: 114 MG/DL (ref 70–99)
HCT VFR BLD AUTO: 31 %
HGB BLD-MCNC: 9.9 G/DL
IMM GRANULOCYTES # BLD AUTO: 0.15 X10(3) UL (ref 0–1)
IMM GRANULOCYTES NFR BLD: 0.9 %
LYMPHOCYTES # BLD AUTO: 1.6 X10(3) UL (ref 1–4)
LYMPHOCYTES NFR BLD AUTO: 9.9 %
MCH RBC QN AUTO: 28.9 PG (ref 26–34)
MCHC RBC AUTO-ENTMCNC: 31.9 G/DL (ref 31–37)
MCV RBC AUTO: 90.4 FL
MONOCYTES # BLD AUTO: 1.07 X10(3) UL (ref 0.1–1)
MONOCYTES NFR BLD AUTO: 6.6 %
NEUTROPHILS # BLD AUTO: 13.39 X10 (3) UL (ref 1.5–7.7)
NEUTROPHILS # BLD AUTO: 13.39 X10(3) UL (ref 1.5–7.7)
NEUTROPHILS NFR BLD AUTO: 82.5 %
OSMOLALITY SERPL CALC.SUM OF ELEC: 281 MOSM/KG (ref 275–295)
PLATELET # BLD AUTO: 205 10(3)UL (ref 150–450)
POTASSIUM SERPL-SCNC: 4 MMOL/L (ref 3.5–5.1)
PROT SERPL-MCNC: 5.3 G/DL (ref 6.4–8.2)
RBC # BLD AUTO: 3.43 X10(6)UL
SODIUM SERPL-SCNC: 135 MMOL/L (ref 136–145)
WBC # BLD AUTO: 16.2 X10(3) UL (ref 4–11)

## 2023-07-11 PROCEDURE — 59400 OBSTETRICAL CARE: CPT | Performed by: OBSTETRICS & GYNECOLOGY

## 2023-07-11 PROCEDURE — 0KQM0ZZ REPAIR PERINEUM MUSCLE, OPEN APPROACH: ICD-10-PCS | Performed by: OBSTETRICS & GYNECOLOGY

## 2023-07-11 RX ORDER — LIDOCAINE HYDROCHLORIDE AND EPINEPHRINE 15; 5 MG/ML; UG/ML
INJECTION, SOLUTION EPIDURAL AS NEEDED
Status: DISCONTINUED | OUTPATIENT
Start: 2023-07-11 | End: 2023-07-11 | Stop reason: SURG

## 2023-07-11 RX ORDER — ACETAMINOPHEN 500 MG
500 TABLET ORAL EVERY 6 HOURS PRN
Status: DISCONTINUED | OUTPATIENT
Start: 2023-07-11 | End: 2023-07-13

## 2023-07-11 RX ORDER — MISOPROSTOL 200 UG/1
1000 TABLET ORAL ONCE
Status: COMPLETED | OUTPATIENT
Start: 2023-07-11 | End: 2023-07-11

## 2023-07-11 RX ORDER — IBUPROFEN 600 MG/1
600 TABLET ORAL EVERY 6 HOURS
Status: DISCONTINUED | OUTPATIENT
Start: 2023-07-11 | End: 2023-07-13

## 2023-07-11 RX ORDER — NALBUPHINE HYDROCHLORIDE 10 MG/ML
2.5 INJECTION, SOLUTION INTRAMUSCULAR; INTRAVENOUS; SUBCUTANEOUS
Status: DISCONTINUED | OUTPATIENT
Start: 2023-07-11 | End: 2023-07-11

## 2023-07-11 RX ORDER — MISOPROSTOL 200 UG/1
TABLET ORAL
Status: COMPLETED
Start: 2023-07-11 | End: 2023-07-11

## 2023-07-11 RX ORDER — DOCUSATE SODIUM 100 MG/1
100 CAPSULE, LIQUID FILLED ORAL
Status: DISCONTINUED | OUTPATIENT
Start: 2023-07-11 | End: 2023-07-13

## 2023-07-11 RX ORDER — ACETAMINOPHEN 500 MG
1000 TABLET ORAL EVERY 6 HOURS PRN
Status: DISCONTINUED | OUTPATIENT
Start: 2023-07-11 | End: 2023-07-13

## 2023-07-11 RX ORDER — CALCIUM CARBONATE 500 MG/1
500 TABLET, CHEWABLE ORAL EVERY 6 HOURS PRN
Status: DISCONTINUED | OUTPATIENT
Start: 2023-07-11 | End: 2023-07-11

## 2023-07-11 RX ORDER — LABETALOL 200 MG/1
200 TABLET, FILM COATED ORAL EVERY 12 HOURS SCHEDULED
Status: DISCONTINUED | OUTPATIENT
Start: 2023-07-11 | End: 2023-07-13

## 2023-07-11 RX ORDER — BISACODYL 10 MG
10 SUPPOSITORY, RECTAL RECTAL ONCE AS NEEDED
Status: DISCONTINUED | OUTPATIENT
Start: 2023-07-11 | End: 2023-07-13

## 2023-07-11 RX ORDER — BUPIVACAINE HCL/0.9 % NACL/PF 0.25 %
5 PLASTIC BAG, INJECTION (ML) EPIDURAL AS NEEDED
Status: DISCONTINUED | OUTPATIENT
Start: 2023-07-11 | End: 2023-07-11

## 2023-07-11 RX ORDER — SIMETHICONE 80 MG
80 TABLET,CHEWABLE ORAL 3 TIMES DAILY PRN
Status: DISCONTINUED | OUTPATIENT
Start: 2023-07-11 | End: 2023-07-13

## 2023-07-11 RX ADMIN — LIDOCAINE HYDROCHLORIDE AND EPINEPHRINE 5 ML: 15; 5 INJECTION, SOLUTION EPIDURAL at 07:33:00

## 2023-07-11 NOTE — PLAN OF CARE
Problem: SAFETY ADULT - FALL  Goal: Free from fall injury  Description: INTERVENTIONS:  - Assess pt frequently for physical needs  - Identify cognitive and physical deficits and behaviors that affect risk of falls.   - [place] fall precautions as indicated by assessment.  - Educate pt/family on patient safety including physical limitations  - Instruct pt to call for assistance with activity based on assessment  - Modify environment to reduce risk of injury  - Provide assistive devices as appropriate  - Consider OT/PT consult to assist with strengthening/mobility  - Encourage toileting schedule  2023 1200 by Dana Alexander RN  Outcome: Progressing  2023 1017 by Dana Alexander RN  Outcome: Progressing     Problem: BIRTH - VAGINAL/ SECTION  Goal: Fetal and maternal status remain reassuring during the birth process  Description: INTERVENTIONS:  - Monitor vital signs  - Monitor fetal heart rate  - Monitor uterine activity  - Monitor labor progression (vaginal delivery)  - DVT prophylaxis (C/S delivery)  - Surgical antibiotic prophylaxis (C/S delivery)  2023 1200 by Dana Alexander RN  Outcome: Completed  2023 1017 by Dana Alexander RN  Outcome: Progressing     Problem: PAIN - ADULT  Goal: Verbalizes/displays adequate comfort level or patient's stated pain goal  Description: INTERVENTIONS:  - Encourage pt to monitor pain and request assistance  - Assess pain using appropriate pain scale  - Administer analgesics based on type and severity of pain and evaluate response  - Implement non-pharmacological measures as appropriate and evaluate response  - Consider cultural and social influences on pain and pain management  - Manage/alleviate anxiety  - Utilize distraction and/or relaxation techniques  - Monitor for opioid side effects  - Notify MD/LIP if interventions unsuccessful or patient reports new pain  - Anticipate increased pain with activity and pre-medicate as appropriate  2023 1200 by Beto Carranza RN  Outcome: Completed  7/11/2023 1017 by Beto Carranza RN  Outcome: Progressing     Problem: ANXIETY  Goal: Will report anxiety at manageable levels  Description: INTERVENTIONS:  - Administer medication as ordered  - Teach and rehearse alternative coping skills  - Provide emotional support with 1:1 interaction with staff  7/11/2023 1200 by Beto Carranza RN  Outcome: Completed  7/11/2023 1017 by Beto Carranza RN  Outcome: Progressing     Problem: Patient/Family Goals  Goal: Patient/Family Long Term Goal  Description: Patient's Long Term Goal: uncomplicated vaginal delivery    Interventions:  -communicate POC  - See additional Care Plan goals for specific interventions  7/11/2023 1200 by Beto Carranza RN  Outcome: Completed  7/11/2023 1017 by Beto Carranza RN  Outcome: Progressing  Goal: Patient/Family Short Term Goal  Description: Patient's Short Term Goal: Pain control    Interventions:   -communicate POC  - See additional Care Plan goals for specific interventions  7/11/2023 1200 by Beto Carranza RN  Outcome: Completed  7/11/2023 1017 by Beto Carranza RN  Outcome: Progressing

## 2023-07-11 NOTE — ANESTHESIA PROCEDURE NOTES
Labor Analgesia    Date/Time: 7/11/2023 7:25 AM    Performed by: Yannick Herrera MD  Authorized by: Yannick Herrera MD      General Information and Staff    Start Time:  7/11/2023 7:25 AM  End Time:  7/11/2023 7:25 AM  Anesthesiologist:  Yannick Herrera MD  Performed by:   Anesthesiologist  Patient Location:  OB  Site Identification: surface landmarks    Reason for Block: labor epidural    Preanesthetic Checklist: patient identified, IV checked, risks and benefits discussed, monitors and equipment checked, pre-op evaluation, timeout performed, IV bolus, anesthesia consent and sterile technique used      Procedure Details    Patient Position:  Sitting  Prep: ChloraPrep    Monitoring:  Heart rate and continuous pulse ox  Approach:  Midline    Epidural Needle    Injection Technique:   Needle Type:  Tuohy  Needle Gauge:  17 G  Needle Length:  3.375 in  Location:  L3-4    Spinal Needle      Catheter    Catheter Type:  End hole  Catheter Size:  19 G  Test Dose:  Negative    Assessment      Additional Comments       12cc/hr No

## 2023-07-11 NOTE — PROGRESS NOTES
Pt taken to postpartum in stable condition per WC. Report given to [facility] AND [facility]. Cares of pt transferred at this time.

## 2023-07-11 NOTE — PROGRESS NOTES
Pt up to BR with assistance from this RN. Gait steady. Pt unable to void at this time. Lashae-bottle given. Pt up to [facility] without incident.

## 2023-07-11 NOTE — L&D DELIVERY NOTE
Sherrel Section, Girl [[id number]]      Labor Events     labor?: No   steroids?: None  Antibiotics received during labor?: No  Rupture date/time: 2023 1000     Rupture type: SROM  Fluid color: Clear  Labor type: Induced Onset of Labor  Induction: Misoprostol  Indications for induction: Gestational Hypertension  Intrapartum & labor complications: Gestational hypertension, Variable decelerations       Labor Event Times    Labor onset date/time: 2023 0230  Dilation complete date/time: 2023 1042  Start pushing date/time: 2023 104        Presentation    Presentation: Vertex  Position: Middle Occiput Anterior       Operative Delivery    Operative Vaginal Delivery: No               Shoulder Dystocia    Shoulder Dystocia: No       Anesthesia    Method: Epidural, Local   Analgesics:  Analgesics   DILAUDID 1 MG/ML IJ SOLN             [place] Delivery      Head delivery date/time: 2023 11:20:34   Delivery date/time:  23 11:20:45   Delivery type: Normal spontaneous vaginal delivery    Details:            Delivery location: delivery room          Delivery Providers    Delivering Clinician: Cindy Weinstein MD   Delivery personnel:  Provider Role   Odessa Westbrook, RN Baby Nurse   Lex Garcia, RN Delivery Nurse             Cord    Vessels: 3 Vessels  Complications: Nuchal  # of loops: 1  Timed cord clamping: Yes  Time in sec: 30  Cord blood disposition: to lab  Gases sent?: No       Resuscitation    Method: None       [place] Measurements      Weight: 3260 g 7 lb 3 oz Length: 50.8 cm     Head circum. : 35 cm Chest circum.: 33 cm      Abdominal circum.: 30 cm           Placenta    Date/time: 2023 1124  Removal: Expressed  Appearance: Intact  Disposition: held for future pathology       Apgars    Living status: Living   Apgar Scoring Key:    0 1 2    Skin color Blue or pale Acrocyanotic Completely pink    Heart rate Absent <100 bpm >100 bpm    Reflex irritability No response Grimace Cry or active withdrawal    Muscle tone Limp Some flexion Active motion    Respiratory effort Absent Weak cry; hypoventilation Good, crying                1 Minute:  5 Minute:  10 Minute:  15 Minute:  20 Minute:      Skin color: 0  1       Heart rate: 2  2       Reflex irritablity: 2  2       Muscle tone: 2  2       Respiratory effort: 2  2       Total: 8  9               Apgars assigned by: MARYLOU LEONARD RN      disposition: with mother             Skin to Skin    Skin to skin initiated date/time: 2023 1155  Skin to skin with: Mother       Vaginal Count    Initial count RN: Apolonia Urena RN  Initial count Tech: Denis Priyank   Sponges   Sharps    Initial counts 11   0    Final counts 42   7    Final count RN: Apolonia Urena RN  Final count MD: Randal Deleon MD       Delivery (Maternal)    Episiotomy: None  Perineal lacerations: 2nd Repaired?: Yes     Periurethral laceration: right      Vaginal laceration?: No      Cervical laceration?: No    Clitoral laceration?: No                Vaginal Delivery Note          Villarreal Clamp Patient Status:  Inpatient    1990 MRN [id number]   Location [address] [place] [address] Attending Randal Deleon MD   [facility] Day # 1 PCP Marily Siemens, DO     Date of Delivery: 23    Pre Op Dx:  IUP at Term    Post Op Dx: Same - delivered    Op: Normal Spontaneous Vaginal Delivery    Surgeon: Marcelo Reyes MD        Anesthesia: Epidural    EBL:  600 cc    Indications:  Patient is a 28year old  at 39w0d who presented for IOL,       Findings:    Sex: female \"Allyeria\"     Weight:3260g      Apgars: 8/9      Lacerations: 2nd perineal laceration, no periurethral, no cervical. Bilateral labial lacerations. Procedure: The patients was placed in the dorsolithotomy position and prepped. She was encouraged to push. As the head was delivered in OA position, the legs were lowered and the perineum was supported to decrease the risk of tearing.  The shoulders rotated easily and delivery was completed without complication. Bulb suction was not performed. The cord was doubly clamped then cut after 30 seconds of cord pulsation noted. The baby was placed on the mother's abdomen at her request. The cord blood was sampled. Placenta delivered spontaneosly by uterine massage. IV Pitocin was then initiated. The uterus was explored and evacuation of blood clots noted. Uterus noted to be firm. Cytotec 1000 mcg per rectum placed. Good hemostasis noted. The perineum, vaginal mucosa and cervix was then examined. The 2nd degree perineal laceration repaired with 2-0 rapide vicryl. Rectal exam performed and sphincter muscle intact prior to repair and repeat exam after repair confirmed intact sphincter without palpable sutures. The bilateral labial lacerations were re-approximated with interrupted 2-0 rapide vircyl sutures. Bleeding was minimal.  The patient was then moved to the supine position in stable condition. Sponge and instrument counts were correct. Complications:  None     Mother and infant in good condition. Norma Drake MD   [facility] - OBGYN            Note to patient and family   The [facility] makes medical notes available to patients in the interest of transparency. However, please be advised that this is a medical document. It is intended as slnk-gm-bdmu communication. It is written and medical language may contain abbreviations or verbiage that are technical and unfamiliar. It may appear blunt or direct. Medical documents are intended to carry relevant information, facts as evident, and the clinical opinion of the practitioner.

## 2023-07-11 NOTE — PLAN OF CARE
Problem: BIRTH - VAGINAL/ SECTION  Goal: Fetal and maternal status remain reassuring during the birth process  Description: INTERVENTIONS:  - Monitor vital signs  - Monitor fetal heart rate  - Monitor uterine activity  - Monitor labor progression (vaginal delivery)  - DVT prophylaxis (C/S delivery)  - Surgical antibiotic prophylaxis (C/S delivery)  Outcome: Progressing     Problem: PAIN - ADULT  Goal: Verbalizes/displays adequate comfort level or patient's stated pain goal  Description: INTERVENTIONS:  - Encourage pt to monitor pain and request assistance  - Assess pain using appropriate pain scale  - Administer analgesics based on type and severity of pain and evaluate response  - Implement non-pharmacological measures as appropriate and evaluate response  - Consider cultural and social influences on pain and pain management  - Manage/alleviate anxiety  - Utilize distraction and/or relaxation techniques  - Monitor for opioid side effects  - Notify MD/LIP if interventions unsuccessful or patient reports new pain  - Anticipate increased pain with activity and pre-medicate as appropriate  Outcome: Progressing     Problem: ANXIETY  Goal: Will report anxiety at manageable levels  Description: INTERVENTIONS:  - Administer medication as ordered  - Teach and rehearse alternative coping skills  - Provide emotional support with 1:1 interaction with staff  Outcome: Progressing     Problem: Patient/Family Goals  Goal: Patient/Family Long Term Goal  Description: Patient's Long Term Goal: uncomplicated vaginal delivery    Interventions:  -communicate POC  - See additional Care Plan goals for specific interventions  Outcome: Progressing  Goal: Patient/Family Short Term Goal  Description: Patient's Short Term Goal: Pain control    Interventions:   -communicate POC  - See additional Care Plan goals for specific interventions  Outcome: Progressing

## 2023-07-12 PROBLEM — O13.3 GESTATIONAL HYPERTENSION, THIRD TRIMESTER: Status: ACTIVE | Noted: 2023-07-11

## 2023-07-12 PROBLEM — D62 ACUTE BLOOD LOSS ANEMIA: Status: ACTIVE | Noted: 2023-07-12

## 2023-07-12 PROBLEM — O13.3 GESTATIONAL HYPERTENSION, THIRD TRIMESTER: Status: ACTIVE | Noted: 2023-07-12

## 2023-07-12 PROBLEM — O13.3 GESTATIONAL HYPERTENSION, THIRD TRIMESTER (HCC): Status: ACTIVE | Noted: 2023-07-12

## 2023-07-12 PROBLEM — O13.3 GESTATIONAL HYPERTENSION, THIRD TRIMESTER (HCC): Status: ACTIVE | Noted: 2023-07-11

## 2023-07-12 LAB
BASOPHILS # BLD AUTO: 0.03 X10(3) UL (ref 0–0.2)
BASOPHILS NFR BLD AUTO: 0.3 %
EOSINOPHIL # BLD AUTO: 0.02 X10(3) UL (ref 0–0.7)
EOSINOPHIL NFR BLD AUTO: 0.2 %
ERYTHROCYTE [DISTWIDTH] IN BLOOD BY AUTOMATED COUNT: 14.9 %
HCT VFR BLD AUTO: 27.1 %
HGB BLD-MCNC: 8.9 G/DL
IMM GRANULOCYTES # BLD AUTO: 0.06 X10(3) UL (ref 0–1)
IMM GRANULOCYTES NFR BLD: 0.6 %
LYMPHOCYTES # BLD AUTO: 2.34 X10(3) UL (ref 1–4)
LYMPHOCYTES NFR BLD AUTO: 21.6 %
MCH RBC QN AUTO: 29 PG (ref 26–34)
MCHC RBC AUTO-ENTMCNC: 32.8 G/DL (ref 31–37)
MCV RBC AUTO: 88.3 FL
MONOCYTES # BLD AUTO: 0.59 X10(3) UL (ref 0.1–1)
MONOCYTES NFR BLD AUTO: 5.5 %
NEUTROPHILS # BLD AUTO: 7.77 X10 (3) UL (ref 1.5–7.7)
NEUTROPHILS # BLD AUTO: 7.77 X10(3) UL (ref 1.5–7.7)
NEUTROPHILS NFR BLD AUTO: 71.8 %
PLATELET # BLD AUTO: 180 10(3)UL (ref 150–450)
RBC # BLD AUTO: 3.07 X10(6)UL
WBC # BLD AUTO: 10.8 X10(3) UL (ref 4–11)

## 2023-07-12 RX ORDER — FUROSEMIDE 10 MG/ML
20 INJECTION INTRAMUSCULAR; INTRAVENOUS ONCE
Status: COMPLETED | OUTPATIENT
Start: 2023-07-12 | End: 2023-07-12

## 2023-07-12 RX ORDER — FUROSEMIDE 20 MG/1
20 TABLET ORAL ONCE
Status: DISCONTINUED | OUTPATIENT
Start: 2023-07-12 | End: 2023-07-12

## 2023-07-12 NOTE — ANESTHESIA POSTPROCEDURE EVALUATION
[address] [facility] Patient Status:  Inpatient   Age/Gender 28year old female MRN [id number]   [facility] 2SW-J Attending Marcello Doyle, [address] [facility] [place] [facility] Day # 2 PCP Daniel Waldrop DO       Anesthesia Post-op Note        Procedure Summary       Date: 07/11/23 Room / Location: [facility] Obstetrics    Anesthesia Start: [place] Anesthesia Stop: 0074    Procedure: CYTOTEC Diagnosis:     Scheduled Providers:  Anesthesiologist: Moe Gonzalez MD    Anesthesia Type: epidural ASA Status: 2            Anesthesia Type: epidural    Vitals Value Taken Time   /46 07/11/23 1216   Temp 97 07/12/23 0848   Pulse 144 07/11/23 1216   Resp 18 07/12/23 0848   SpO2 100 07/12/23 0848       Patient Location: Labor and Delivery    Anesthesia Type: epidural    Airway Patency: patent    Postop Pain Control: adequate    Mental Status: preanesthetic baseline    Nausea/Vomiting: none    Cardiopulmonary/Hydration status: stable euvolemic    Complications: no apparent anesthesia related complications    Postop vital signs: stable    Dental Exam: Unchanged from Preop

## 2023-07-13 VITALS
WEIGHT: 255 LBS | HEART RATE: 79 BPM | SYSTOLIC BLOOD PRESSURE: 134 MMHG | HEIGHT: 71 IN | TEMPERATURE: 98 F | BODY MASS INDEX: 35.7 KG/M2 | DIASTOLIC BLOOD PRESSURE: 72 MMHG | RESPIRATION RATE: 12 BRPM

## 2023-07-13 PROCEDURE — 3E0134Z INTRODUCTION OF SERUM, TOXOID AND VACCINE INTO SUBCUTANEOUS TISSUE, PERCUTANEOUS APPROACH: ICD-10-PCS | Performed by: OBSTETRICS & GYNECOLOGY

## 2023-07-13 RX ORDER — MELATONIN
325
Qty: 30 TABLET | Refills: 0 | Status: SHIPPED | OUTPATIENT
Start: 2023-07-13 | End: 2023-07-13

## 2023-07-13 RX ORDER — LABETALOL 200 MG/1
200 TABLET, FILM COATED ORAL EVERY 12 HOURS SCHEDULED
Qty: 60 TABLET | Refills: 2 | Status: SHIPPED | OUTPATIENT
Start: 2023-07-13

## 2023-07-13 RX ORDER — IBUPROFEN 600 MG/1
600 TABLET ORAL EVERY 6 HOURS PRN
Qty: 30 TABLET | Refills: 0 | Status: SHIPPED | OUTPATIENT
Start: 2023-07-13

## 2023-07-13 RX ORDER — ACETAMINOPHEN 500 MG
500 TABLET ORAL EVERY 6 HOURS PRN
Qty: 30 TABLET | Refills: 0 | Status: SHIPPED | OUTPATIENT
Start: 2023-07-13

## 2023-07-13 NOTE — DISCHARGE SUMMARY
[facility]  Discharge Summary    Kehinde Dias Patient Status:  inpatient    1990 MRN [id number]   Location [address] Attending [facility]   Hosp Day # 3 PCP Iona Bingham DO     Date of Admission: 7/10/2023    Date of Discharge: 23    Admitting Diagnosis: 28year old  at 38+0 presenting for IOL 2/2 gHTN    Discharge Diagnosis: PPD#2 s/p     Hospital Course: The patient is a 28year old female now  who was admitted on 23 at 39wk0d for IOL 2/2 gHTN. Cytotec was used for ripening. Epidural was placed for pain management per patient request. The patient progressed to complete. S/p  on 23. Please refer to delivery note for further details. PPD#1, the patient was doing well. She was ambulating and voiding without difficulty. She was tolerating a general diet. Pain was well controlled. PPD#2, the patient continued to do well and was meeting post partum expectations. She was discharged to home. She was discharged with labetalol 200 BID and strict precautions to monitor her BP daily and to call/present to the ED if specific parameters are met. NO evidence of preeclampsia at time of discharge. Consultations: none    Procedures:     Complications: none    Discharge Condition: Stable    Discharge Medications: Current Discharge Medication List       Medication List        START taking these medications      acetaminophen 500 MG Tabs  Commonly known as: Tylenol Extra Strength  Take 1 tablet (500 mg total) by mouth every 6 (six) hours as needed. ferrous sulfate 325 (65 FE) MG Tbec  Take 1 tablet (325 mg total) by mouth daily with breakfast.     ibuprofen 600 MG Tabs  Commonly known as: Motrin  Take 1 tablet (600 mg total) by mouth every 6 (six) hours as needed for Pain.     labetalol 200 MG Tabs  Commonly known as: Normodyne  Take 1 tablet (200 mg total) by mouth every 12 (twelve) hours.             CONTINUE taking these medications      Prenatal Vitamins 28-0.8 MG Tabs               Where to Get Your Medications        These medications were sent to [facility], [phone], [phone]  [facility] [address] [facility], [address] [place] [address] Drive      Phone: [phone]   acetaminophen 500 MG Tabs  ferrous sulfate 325 (65 FE) MG Tbec  ibuprofen 600 MG Tabs  labetalol 200 MG Tabs           Follow up Visits: Follow-up with [facility] in 6 weeks      Cuba Green MD   [facility] - [facility]BGYN        Note to patient and family   The [facility] makes medical notes available to patients in the interest of transparency. However, please be advised that this is a medical document. It is intended as wqrt-fe-xawy communication. It is written and medical language may contain abbreviations or verbiage that are technical and unfamiliar. It may appear blunt or direct. Medical documents are intended to carry relevant information, facts as evident, and the clinical opinion of the practitioner.

## 2023-07-13 NOTE — DISCHARGE INSTRUCTIONS
Please check your BP daily. If your blood pressure if every 160 or >  / 110 or > present to the emergency room immediately. If your blood pressure is consistently 145-159 /  please call the office as we may ask you to increase your BP meds    Preeclampsia is a serious disease related to high blood pressure (hypertension). Preeclampsia/hypertension can happen during pregnancy and up to 6 weeks following childbirth.   Contact your doctor or midwife immediately if you experience any of the following symptoms:  Headache that does not go away  Visual changes (seeing spots, blurred vision or partial vision loss)  Feeling nauseated or vomiting  Stomach pain/heartburn  Swelling in your hands, feet or face  Sudden weight gain  Shortness of breath  \"Just not feeling right\"

## 2023-07-13 NOTE — PLAN OF CARE

## 2023-07-14 ENCOUNTER — TELEPHONE (OUTPATIENT)
Dept: OBGYN CLINIC | Facility: CLINIC | Age: 33
End: 2023-07-14

## 2023-07-14 NOTE — TELEPHONE ENCOUNTER
Vaginal delivery 7/11/23  Gestational hypertension  Labetalol 200 mg BID    Spoke to patient- she is doing well. Will start to monitor blood pressure twice daily as soon as her  picks up the cuff. Instructed patient to call with any blood pressures greater than 150/100, persistent headache that is not improved with rest/fluids/food/over the counter pain medication, vision changes, pain in your right upper side, or unusual/new swelling. Patient verbalized understanding.

## 2023-07-14 NOTE — TELEPHONE ENCOUNTER
PT called stating she was advised to schedule an appointment for bp check, I did schedule her for   Future Appointments   Date Time Provider Ashleigh Turner   7/17/2023 11:30 AM [facility] OB NURSE [facility] P [facility] [address]   8/24/2023 10:15 AM RIGO Saenz [facility] P [facility] [address]   Not sure if this will work because I know Clearance Kareem will be out next week.

## 2023-07-15 ENCOUNTER — TELEPHONE (OUTPATIENT)
Dept: OBGYN UNIT | Facility: HOSPITAL | Age: 33
End: 2023-07-15

## 2023-07-15 NOTE — PROGRESS NOTES
Spoke with patient about follow up care for self and infant. All questions and concerns addressed. Patient verbalized understanding and agreed to f/u with OBGYN and PEDS as needed. Flyers sent to My Chart.

## 2023-07-17 ENCOUNTER — NURSE ONLY (OUTPATIENT)
Dept: OBGYN CLINIC | Facility: CLINIC | Age: 33
End: 2023-07-17
Payer: COMMERCIAL

## 2023-07-17 VITALS
SYSTOLIC BLOOD PRESSURE: 114 MMHG | WEIGHT: 235.38 LBS | DIASTOLIC BLOOD PRESSURE: 74 MMHG | HEART RATE: 104 BPM | BODY MASS INDEX: 33 KG/M2

## 2023-07-17 NOTE — PROGRESS NOTES
Patient seen in the office today for a blood pressure check. Had a vaginal delivery 23. Hx of gestational hypertension. Was discharged on Labetalol 200 mg every 12 hours. Currently is breastfeeding. Patient denies SOB, headaches, vision changes (spots, blurred vision), nausea, vomiting, stomach pain/heartburn, swelling in hands, feet or face. Reports swelling of her feet but states they are getting better. BP Lo/14/23 4:38 PM - 122/84  07/15/23 1:57 /87 and 143/92 8:12 PM  23 12:50 /89 and 127/88 at 4:23 PM    Please call the office if you have any blood pressures greater than 150/100, have a persistent headache that is not improved with rest/fluids/food/over the counter pain medication, vision changes, pain in your right upper side, or unusual/new swelling. Per protocol patient to continue medication and be seen for PP visit.

## 2023-07-29 ENCOUNTER — PATIENT MESSAGE (OUTPATIENT)
Dept: OBGYN CLINIC | Facility: CLINIC | Age: 33
End: 2023-07-29

## 2023-07-31 RX ORDER — LABETALOL 100 MG/1
100 TABLET, FILM COATED ORAL 2 TIMES DAILY
Qty: 60 TABLET | Refills: 0 | Status: SHIPPED | OUTPATIENT
Start: 2023-07-31

## 2023-07-31 NOTE — TELEPHONE ENCOUNTER
PP visit scheduled for 23     on 23  Gestational HTN, started on Labetalol inpatient and prescribed 200 mg BID after delivery

## 2023-07-31 NOTE — TELEPHONE ENCOUNTER
From: Eufemia Peraza  To: Renate Marrufo MD  Sent: 7/29/2023 5:59 PM CDT  Subject: Blood Pressure Medication    Hello,     I am currently supposed to be taking Labetalol for high blood pressure/gestational hypertension. I would like to discontinue taking the medication or take smaller dosage as my blood pressure has now been consistently low. The last couple of low readings were 110/74, 100/64, 113/71. Admittedly, I had forgotten to take the medication a couple of times and then got readings of 109/62 and today 123/81 (after missing about 3 doses). My last appointment was about 1 week postpartum and was just a reading in office with a nurse. I had a normal reading and I was instructed to continue the medication until my 6 week postpartum visit. However, I am now concerned about continuing the medication for that long if it is no longer needed. Please advise. Thank you!    Rebecca Vasquez

## 2023-08-10 ENCOUNTER — TELEPHONE (OUTPATIENT)
Dept: OBGYN CLINIC | Facility: CLINIC | Age: 33
End: 2023-08-10

## 2023-08-10 NOTE — TELEPHONE ENCOUNTER
Called patient; no answer. Left a message to call back. Following up on patient's BP since dosage was changed.

## 2023-08-24 ENCOUNTER — POSTPARTUM (OUTPATIENT)
Dept: OBGYN CLINIC | Facility: CLINIC | Age: 33
End: 2023-08-24
Payer: COMMERCIAL

## 2023-08-24 VITALS
SYSTOLIC BLOOD PRESSURE: 114 MMHG | BODY MASS INDEX: 32.65 KG/M2 | HEART RATE: 92 BPM | DIASTOLIC BLOOD PRESSURE: 76 MMHG | WEIGHT: 233.19 LBS | HEIGHT: 71 IN

## 2023-08-24 PROCEDURE — 3008F BODY MASS INDEX DOCD: CPT | Performed by: NURSE PRACTITIONER

## 2023-08-24 PROCEDURE — 3078F DIAST BP <80 MM HG: CPT | Performed by: NURSE PRACTITIONER

## 2023-08-24 PROCEDURE — 3074F SYST BP LT 130 MM HG: CPT | Performed by: NURSE PRACTITIONER

## 2023-12-30 PROBLEM — O16.3 ELEVATED BLOOD PRESSURE AFFECTING PREGNANCY IN THIRD TRIMESTER, ANTEPARTUM (HCC): Status: ACTIVE | Noted: 2023-12-30

## 2023-12-30 PROBLEM — O16.3 ELEVATED BLOOD PRESSURE AFFECTING PREGNANCY IN THIRD TRIMESTER, ANTEPARTUM: Status: ACTIVE | Noted: 2023-12-30

## 2024-02-07 ENCOUNTER — OFFICE VISIT (OUTPATIENT)
Dept: FAMILY MEDICINE CLINIC | Facility: CLINIC | Age: 34
End: 2024-02-07
Payer: COMMERCIAL

## 2024-02-07 VITALS
HEIGHT: 71 IN | DIASTOLIC BLOOD PRESSURE: 72 MMHG | HEART RATE: 88 BPM | BODY MASS INDEX: 30.27 KG/M2 | TEMPERATURE: 97 F | RESPIRATION RATE: 16 BRPM | SYSTOLIC BLOOD PRESSURE: 114 MMHG | WEIGHT: 216.25 LBS | OXYGEN SATURATION: 100 %

## 2024-02-07 DIAGNOSIS — Z23 NEED FOR INFLUENZA VACCINATION: ICD-10-CM

## 2024-02-07 DIAGNOSIS — Z13.220 SCREENING CHOLESTEROL LEVEL: ICD-10-CM

## 2024-02-07 DIAGNOSIS — Z00.00 WELL ADULT EXAM: Primary | ICD-10-CM

## 2024-02-07 DIAGNOSIS — Z13.31 NEGATIVE DEPRESSION SCREENING: ICD-10-CM

## 2024-02-07 DIAGNOSIS — D64.9 ANEMIA, UNSPECIFIED TYPE: ICD-10-CM

## 2024-02-07 PROCEDURE — 90471 IMMUNIZATION ADMIN: CPT | Performed by: STUDENT IN AN ORGANIZED HEALTH CARE EDUCATION/TRAINING PROGRAM

## 2024-02-07 PROCEDURE — 99385 PREV VISIT NEW AGE 18-39: CPT | Performed by: STUDENT IN AN ORGANIZED HEALTH CARE EDUCATION/TRAINING PROGRAM

## 2024-02-07 PROCEDURE — 90686 IIV4 VACC NO PRSV 0.5 ML IM: CPT | Performed by: STUDENT IN AN ORGANIZED HEALTH CARE EDUCATION/TRAINING PROGRAM

## 2024-02-07 NOTE — PROGRESS NOTES
Subjective:      Chief Complaint   Patient presents with    Physical    Establish Care    Immunization/Injection     Flu vaccine today -> consent signed     HISTORY OF PRESENT ILLNESS  HPI  HPI obtained per patient report.  Berna Lopez is a pleasant 33 year old female presenting for her annual physical.   She is feeling well today.     PAST PATIENT HISTORY  Past Medical History:   Diagnosis Date    Essential hypertension 2023    Gestational hypertension - since resolved    Gestational hypertension, third trimester 2023    Onychocryptosis 2015     Past Surgical History:   Procedure Laterality Date      2023       CURRENT MEDICATIONS  No outpatient medications have been marked as taking for the 24 encounter (Office Visit) with Danna Renteria MD.       HEALTH MAINTENANCE  Immunization History   Administered Date(s) Administered    Covid-19 Vaccine Pfizer 30 mcg/0.3 ml 11/10/2021    Covid-19 Vaccine Pfizer Bivalent 30mcg/0.3mL 10/07/2022    FLUZONE 6 months and older PFS 0.5 ml (00003) 10/26/2021, 10/07/2022, 2024    MMR 2023    TDAP 2023       ALLERGIES AND DRUG REACTIONS  No Known Allergies    Family History   Problem Relation Age of Onset    Heart Disorder Father     Hypertension Mother     Cancer Paternal Grandmother     Diabetes Paternal Grandmother     Heart Disorder Paternal Grandmother      Social History     Socioeconomic History    Marital status:    Tobacco Use    Smoking status: Never    Smokeless tobacco: Never   Vaping Use    Vaping Use: Never used   Substance and Sexual Activity    Alcohol use: Not Currently    Drug use: Not Currently     Types: Cannabis    Sexual activity: Not Currently   Other Topics Concern    Caffeine Concern No    Stress Concern No    Weight Concern No    Special Diet No    Exercise Yes    Seat Belt Yes   Social History Narrative    ** Merged History Encounter **          Social Determinants of Health     Financial Resource  Strain: Low Risk  (7/10/2023)    Financial Resource Strain     Difficulty of Paying Living Expenses: Not hard at all     Med Affordability: No   Food Insecurity: No Food Insecurity (7/10/2023)    Food Insecurity     Food Insecurity: Never true   Transportation Needs: No Transportation Needs (7/10/2023)    Transportation Needs     Lack of Transportation: No   Stress: No Stress Concern Present (7/10/2023)    Stress     Feeling of Stress : No   Housing Stability: Low Risk  (7/10/2023)    Housing Stability     Housing Instability: No       Review of Systems   All other systems reviewed and are negative.         Objective:      /72   Pulse 88   Temp 97 °F (36.1 °C) (Temporal)   Resp 16   Ht 5' 11\" (1.803 m)   Wt 216 lb 4 oz (98.1 kg)   LMP 01/14/2024 (Exact Date)   SpO2 100%   Breastfeeding Yes   BMI 30.16 kg/m²   Body mass index is 30.16 kg/m².    Physical Exam  Vitals reviewed.   Constitutional:       General: She is not in acute distress.     Appearance: She is not ill-appearing, toxic-appearing or diaphoretic.   HENT:      Head: Normocephalic and atraumatic.   Eyes:      General: No scleral icterus.        Right eye: No discharge.         Left eye: No discharge.      Extraocular Movements: Extraocular movements intact.      Conjunctiva/sclera: Conjunctivae normal.   Neck:      Thyroid: No thyroid mass, thyromegaly or thyroid tenderness.   Cardiovascular:      Rate and Rhythm: Normal rate and regular rhythm.      Heart sounds: Normal heart sounds.   Pulmonary:      Effort: Pulmonary effort is normal.      Breath sounds: Normal breath sounds.   Abdominal:      General: Bowel sounds are normal. There is no distension.      Palpations: Abdomen is soft. There is no mass.      Tenderness: There is no abdominal tenderness. There is no guarding or rebound.      Hernia: No hernia is present.   Musculoskeletal:      Cervical back: Neck supple. No tenderness.      Right lower leg: No edema.      Left lower leg:  No edema.   Lymphadenopathy:      Cervical: No cervical adenopathy.   Neurological:      Mental Status: She is alert and oriented to person, place, and time.            Assessment and Plan:      1. Well adult exam (Primary)  2. Screening cholesterol level  -     Lipid Panel  3. Need for influenza vaccination  -     Fluzone Quadrivalent 6mo+ 0.5mL  4. Negative depression screening  5. Anemia, unspecified type  -     CBC With Differential With Platelet  -     Comp Metabolic Panel (14)  -     TSH W Reflex To Free T4    Return in about 1 year (around 2/7/2025).    - lab orders provided  - annual influenza vaccine administered with pt's consent    Patient verbalized understanding of assessment and recommendations. All questions and concerns were addressed.    Electronically signed by Danna Renteria MD

## 2024-02-29 ENCOUNTER — PATIENT MESSAGE (OUTPATIENT)
Dept: OBGYN CLINIC | Facility: CLINIC | Age: 34
End: 2024-02-29

## 2024-03-01 LAB
ABSOLUTE BASOPHILS: 22 CELLS/UL (ref 0–200)
ABSOLUTE EOSINOPHILS: 49 CELLS/UL (ref 15–500)
ABSOLUTE LYMPHOCYTES: 1102 CELLS/UL (ref 850–3900)
ABSOLUTE MONOCYTES: 443 CELLS/UL (ref 200–950)
ABSOLUTE NEUTROPHILS: 3785 CELLS/UL (ref 1500–7800)
ALBUMIN/GLOBULIN RATIO: 1.6 (CALC) (ref 1–2.5)
ALBUMIN: 4.5 G/DL (ref 3.6–5.1)
ALKALINE PHOSPHATASE: 120 U/L (ref 31–125)
ALT: 8 U/L (ref 6–29)
AST: 13 U/L (ref 10–30)
BASOPHILS: 0.4 %
BILIRUBIN, TOTAL: 0.4 MG/DL (ref 0.2–1.2)
BUN: 15 MG/DL (ref 7–25)
CALCIUM: 9.7 MG/DL (ref 8.6–10.2)
CARBON DIOXIDE: 26 MMOL/L (ref 20–32)
CHLORIDE: 101 MMOL/L (ref 98–110)
CHOL/HDLC RATIO: 2.1 (CALC)
CHOLESTEROL, TOTAL: 170 MG/DL
CREATININE: 0.86 MG/DL (ref 0.5–0.97)
EGFR: 91 ML/MIN/1.73M2
EOSINOPHILS: 0.9 %
GLOBULIN: 2.9 G/DL (CALC) (ref 1.9–3.7)
GLUCOSE: 106 MG/DL (ref 65–139)
HDL CHOLESTEROL: 81 MG/DL
HEMATOCRIT: 37.6 % (ref 35–45)
HEMOGLOBIN: 12.3 G/DL (ref 11.7–15.5)
LDL-CHOLESTEROL: 75 MG/DL (CALC)
LYMPHOCYTES: 20.4 %
MCH: 29.6 PG (ref 27–33)
MCHC: 32.7 G/DL (ref 32–36)
MCV: 90.4 FL (ref 80–100)
MONOCYTES: 8.2 %
MPV: 10.9 FL (ref 7.5–12.5)
NEUTROPHILS: 70.1 %
NON-HDL CHOLESTEROL: 89 MG/DL (CALC)
PLATELET COUNT: 229 THOUSAND/UL (ref 140–400)
POTASSIUM: 4.4 MMOL/L (ref 3.5–5.3)
PROTEIN, TOTAL: 7.4 G/DL (ref 6.1–8.1)
RDW: 12.6 % (ref 11–15)
RED BLOOD CELL COUNT: 4.16 MILLION/UL (ref 3.8–5.1)
SODIUM: 136 MMOL/L (ref 135–146)
TRIGLYCERIDES: 62 MG/DL
TSH W/REFLEX TO FT4: 0.95 MIU/L
WHITE BLOOD CELL COUNT: 5.4 THOUSAND/UL (ref 3.8–10.8)

## 2024-03-01 NOTE — TELEPHONE ENCOUNTER
From: Berna Lopez  To: Chana Benz  Sent: 2/29/2024 1:36 PM CST  Subject: Test Question    Hi!     I'm the worst patient and have been dealing with chronic urine infection symptoms on and off. There's been an order for me to go to [facility] to get a urine test and I just have not been able to get the time to go with my little one at home.     I FINALLY have got the time and made an appointment for this afternoon. I'm hoping the order is still there, but just wanted to mention this. My symptoms have been flaring up again today so hopefully this will provide some answers.     Thank you,  Berna

## 2024-06-27 ENCOUNTER — TELEPHONE (OUTPATIENT)
Dept: OBGYN CLINIC | Facility: CLINIC | Age: 34
End: 2024-06-27

## 2025-03-31 ENCOUNTER — OFFICE VISIT (OUTPATIENT)
Dept: FAMILY MEDICINE CLINIC | Facility: CLINIC | Age: 35
End: 2025-03-31
Payer: COMMERCIAL

## 2025-03-31 VITALS
HEIGHT: 71 IN | RESPIRATION RATE: 16 BRPM | HEART RATE: 78 BPM | WEIGHT: 236.13 LBS | BODY MASS INDEX: 33.06 KG/M2 | SYSTOLIC BLOOD PRESSURE: 110 MMHG | TEMPERATURE: 98 F | DIASTOLIC BLOOD PRESSURE: 60 MMHG

## 2025-03-31 DIAGNOSIS — Z13.31 NEGATIVE DEPRESSION SCREENING: ICD-10-CM

## 2025-03-31 DIAGNOSIS — Z00.00 WELL ADULT EXAM: Primary | ICD-10-CM

## 2025-03-31 DIAGNOSIS — G89.29 CHRONIC PAIN OF LEFT KNEE: ICD-10-CM

## 2025-03-31 DIAGNOSIS — E66.09 CLASS 1 OBESITY DUE TO EXCESS CALORIES WITHOUT SERIOUS COMORBIDITY WITH BODY MASS INDEX (BMI) OF 32.0 TO 32.9 IN ADULT: ICD-10-CM

## 2025-03-31 DIAGNOSIS — E66.811 CLASS 1 OBESITY DUE TO EXCESS CALORIES WITHOUT SERIOUS COMORBIDITY WITH BODY MASS INDEX (BMI) OF 32.0 TO 32.9 IN ADULT: ICD-10-CM

## 2025-03-31 DIAGNOSIS — Z13.220 SCREENING CHOLESTEROL LEVEL: ICD-10-CM

## 2025-03-31 DIAGNOSIS — Z13.21 ENCOUNTER FOR VITAMIN DEFICIENCY SCREENING: ICD-10-CM

## 2025-03-31 DIAGNOSIS — M25.562 CHRONIC PAIN OF LEFT KNEE: ICD-10-CM

## 2025-03-31 PROCEDURE — 99395 PREV VISIT EST AGE 18-39: CPT | Performed by: STUDENT IN AN ORGANIZED HEALTH CARE EDUCATION/TRAINING PROGRAM

## 2025-03-31 NOTE — PROGRESS NOTES
Subjective:      Chief Complaint   Patient presents with    Physical     HISTORY OF PRESENT ILLNESS  HPI  HPI obtained per patient report.  Berna Lopez is a pleasant 34 year old female presenting for her annual physical.   She reports L knee clicking and pain for more than 6 months. Her symptoms are exacerbated by ascending stairs and have been worsening over time. She denies preceding injuries.   She is also interested in discussing medication options to assist her in her weight management journey. She has tried healthy dietary modifications and regular exercise without significant success unfortunately.     PAST PATIENT HISTORY  Past Medical History:    Essential hypertension    Gestational hypertension - since resolved    Gestational hypertension, third trimester (HCC)    Onychocryptosis     Past Surgical History:   Procedure Laterality Date      2023       CURRENT MEDICATIONS  Medications Taking[1]    HEALTH MAINTENANCE  Immunization History   Administered Date(s) Administered    Covid-19 Vaccine Pfizer 30 mcg/0.3 ml 11/10/2021    Covid-19 Vaccine Pfizer Bivalent 30mcg/0.3mL 10/07/2022    FLUZONE 6 months and older PFS 0.5 ml (64196) 10/26/2021, 10/07/2022, 2024    MMR 2023    TDAP 2023       ALLERGIES AND DRUG REACTIONS  Allergies[2]    Family History   Problem Relation Age of Onset    Heart Disorder Father     Hypertension Mother     Cancer Paternal Grandmother     Diabetes Paternal Grandmother     Heart Disorder Paternal Grandmother      Social History     Socioeconomic History    Marital status:    Tobacco Use    Smoking status: Never    Smokeless tobacco: Never   Vaping Use    Vaping status: Never Used   Substance and Sexual Activity    Alcohol use: Not Currently    Drug use: Not Currently     Types: Cannabis    Sexual activity: Not Currently   Other Topics Concern    Caffeine Concern No    Stress Concern No    Weight Concern No    Special Diet No    Exercise Yes     Seat Belt Yes   Social History Narrative    ** Merged History Encounter **          Social Drivers of Health     Food Insecurity: No Food Insecurity (7/10/2023)    Food Insecurity     Food Insecurity: Never true   Transportation Needs: No Transportation Needs (7/10/2023)    Transportation Needs     Lack of Transportation: No   Stress: No Stress Concern Present (7/10/2023)    Stress     Feeling of Stress : No   Housing Stability: Low Risk  (7/10/2023)    Housing Stability     Housing Instability: No       Review of Systems   Musculoskeletal:  Positive for arthralgias.   All other systems reviewed and are negative.         Objective:      /60   Pulse 78   Temp 97.9 °F (36.6 °C) (Temporal)   Resp 16   Ht 5' 11\" (1.803 m)   Wt 236 lb 2 oz (107.1 kg)   LMP 03/15/2025 (Exact Date)   Breastfeeding No   BMI 32.93 kg/m²   Body mass index is 32.93 kg/m².    Physical Exam  Vitals reviewed.   Constitutional:       General: She is not in acute distress.     Appearance: She is not ill-appearing, toxic-appearing or diaphoretic.   HENT:      Head: Normocephalic and atraumatic.      Nose: Nose normal.   Eyes:      General: No scleral icterus.        Right eye: No discharge.         Left eye: No discharge.      Extraocular Movements: Extraocular movements intact.      Conjunctiva/sclera: Conjunctivae normal.   Cardiovascular:      Rate and Rhythm: Normal rate and regular rhythm.      Heart sounds: Normal heart sounds.   Pulmonary:      Effort: Pulmonary effort is normal.      Breath sounds: Normal breath sounds.   Abdominal:      General: Abdomen is flat. Bowel sounds are normal. There is no distension.      Palpations: Abdomen is soft. There is no mass.      Tenderness: There is no abdominal tenderness. There is no guarding or rebound.      Hernia: No hernia is present.   Musculoskeletal:         General: No swelling, tenderness or deformity. Normal range of motion.      Right lower leg: No edema.      Left lower leg:  No edema.      Comments: L knee exam:   Mild to moderate crepitus with PROM  Anterior/posterior drawer tests negative  MCL/LCL WNL without laxity  Thessaly test negative   Skin:     General: Skin is warm and dry.      Coloration: Skin is not jaundiced or pale.      Findings: No bruising, erythema or rash.   Neurological:      General: No focal deficit present.      Mental Status: She is alert and oriented to person, place, and time.   Psychiatric:         Mood and Affect: Mood normal.            Assessment and Plan:      1. Well adult exam (Primary)  -     CBC With Differential With Platelet  -     Comp Metabolic Panel (14)  -     Lipid Panel  -     TSH W Reflex To Free T4  -     Vitamin D, 25-Hydroxy  2. Screening cholesterol level  -     Lipid Panel  3. Encounter for vitamin deficiency screening  -     Vitamin D, 25-Hydroxy  4. Negative depression screening  5. Chronic pain of left knee  -     OP REFERRAL TO [facility]  -     XR KNEE (1 OR 2 VIEWS), LEFT (CPT=73560); Future; Expected date: 03/31/2025  6. Body mass index (BMI) 32.0-32.9, adult  7. Class 1 obesity due to excess calories without serious comorbidity with body mass index (BMI) of 32.0 to 32.9 in adult    No follow-ups on file.    Physical  - annual lab orders were discussed and provided for her today  - she is UTD on her pap smears. Her next screening pap is due in 9/2027   - she is UTD on her immunizations    Chronic L knee pain  - exam findings are consistent with OA vs patellofemoral pain  - recommended XR for further evaluation  - PT referral information was provided to her today, but we discussed that I will follow-up with her with next steps once her x-ray results are available   - recommended activity modification and ibuprofen or aleve as needed    BMI>32  - may be contributing to the above  - if her lab results are noncontributory, a shared decision was made to try phentermine in conjunction with her healthy lifestyle  modifications. We discussed the R/B/A of this medication  - we discussed that she would be asked to follow-up in 1 month if we proceed with starting phentermine    Patient verbalized understanding of assessment and recommendations. All questions and concerns were addressed.    Electronically signed by Danna Renteria MD         [1]   No outpatient medications have been marked as taking for the 3/31/25 encounter (Office Visit) with Danna Renteria MD.   [2] No Known Allergies

## 2025-04-03 ENCOUNTER — HOSPITAL ENCOUNTER (OUTPATIENT)
Dept: GENERAL RADIOLOGY | Age: 35
Discharge: HOME | End: 2025-04-03
Attending: STUDENT IN AN ORGANIZED HEALTH CARE EDUCATION/TRAINING PROGRAM
Payer: COMMERCIAL

## 2025-04-03 DIAGNOSIS — G89.29 CHRONIC PAIN OF LEFT KNEE: ICD-10-CM

## 2025-04-03 DIAGNOSIS — M25.562 CHRONIC PAIN OF LEFT KNEE: ICD-10-CM

## 2025-04-03 PROCEDURE — 73560 X-RAY EXAM OF KNEE 1 OR 2: CPT | Performed by: STUDENT IN AN ORGANIZED HEALTH CARE EDUCATION/TRAINING PROGRAM

## 2025-04-04 LAB
ABSOLUTE BASOPHILS: 10 CELLS/UL (ref 0–200)
ABSOLUTE EOSINOPHILS: 40 CELLS/UL (ref 15–500)
ABSOLUTE LYMPHOCYTES: 1800 CELLS/UL (ref 850–3900)
ABSOLUTE MONOCYTES: 425 CELLS/UL (ref 200–950)
ABSOLUTE NEUTROPHILS: 2725 CELLS/UL (ref 1500–7800)
ALBUMIN/GLOBULIN RATIO: 1.6 (CALC) (ref 1–2.5)
ALBUMIN: 4.5 G/DL (ref 3.6–5.1)
ALKALINE PHOSPHATASE: 80 U/L (ref 31–125)
ALT: 10 U/L (ref 6–29)
AST: 15 U/L (ref 10–30)
BASOPHILS: 0.2 %
BILIRUBIN, TOTAL: 0.5 MG/DL (ref 0.2–1.2)
BUN: 14 MG/DL (ref 7–25)
CALCIUM: 9.7 MG/DL (ref 8.6–10.2)
CARBON DIOXIDE: 27 MMOL/L (ref 20–32)
CHLORIDE: 104 MMOL/L (ref 98–110)
CHOL/HDLC RATIO: 2.2 (CALC)
CHOLESTEROL, TOTAL: 163 MG/DL
CREATININE: 0.79 MG/DL (ref 0.5–0.97)
EGFR: 101 ML/MIN/1.73M2
EOSINOPHILS: 0.8 %
GLOBULIN: 2.8 G/DL (CALC) (ref 1.9–3.7)
GLUCOSE: 89 MG/DL (ref 65–99)
HDL CHOLESTEROL: 75 MG/DL
HEMATOCRIT: 42.2 % (ref 35–45)
HEMOGLOBIN: 13.6 G/DL (ref 11.7–15.5)
LDL-CHOLESTEROL: 74 MG/DL (CALC)
LYMPHOCYTES: 36 %
MCH: 30.2 PG (ref 27–33)
MCHC: 32.2 G/DL (ref 32–36)
MCV: 93.8 FL (ref 80–100)
MONOCYTES: 8.5 %
MPV: 10.7 FL (ref 7.5–12.5)
NEUTROPHILS: 54.5 %
NON-HDL CHOLESTEROL: 88 MG/DL (CALC)
PLATELET COUNT: 251 THOUSAND/UL (ref 140–400)
POTASSIUM: 4.8 MMOL/L (ref 3.5–5.3)
PROTEIN, TOTAL: 7.3 G/DL (ref 6.1–8.1)
RDW: 12 % (ref 11–15)
RED BLOOD CELL COUNT: 4.5 MILLION/UL (ref 3.8–5.1)
SODIUM: 138 MMOL/L (ref 135–146)
TRIGLYCERIDES: 59 MG/DL
TSH W/REFLEX TO FT4: 0.96 MIU/L
VITAMIN D, 25-OH, TOTAL: 28 NG/ML (ref 30–100)
WHITE BLOOD CELL COUNT: 5 THOUSAND/UL (ref 3.8–10.8)

## 2025-04-07 ENCOUNTER — PATIENT MESSAGE (OUTPATIENT)
Dept: FAMILY MEDICINE CLINIC | Facility: CLINIC | Age: 35
End: 2025-04-07

## 2025-04-07 DIAGNOSIS — E66.09 CLASS 1 OBESITY DUE TO EXCESS CALORIES WITHOUT SERIOUS COMORBIDITY WITH BODY MASS INDEX (BMI) OF 32.0 TO 32.9 IN ADULT: Primary | ICD-10-CM

## 2025-04-07 DIAGNOSIS — E66.811 CLASS 1 OBESITY DUE TO EXCESS CALORIES WITHOUT SERIOUS COMORBIDITY WITH BODY MASS INDEX (BMI) OF 32.0 TO 32.9 IN ADULT: Primary | ICD-10-CM

## 2025-04-08 ENCOUNTER — TELEPHONE (OUTPATIENT)
Dept: FAMILY MEDICINE CLINIC | Facility: CLINIC | Age: 35
End: 2025-04-08

## 2025-04-08 RX ORDER — PHENTERMINE HYDROCHLORIDE 15 MG/1
15 CAPSULE ORAL EVERY MORNING
Qty: 30 CAPSULE | Refills: 0 | Status: SHIPPED | OUTPATIENT
Start: 2025-04-08 | End: 2025-05-08

## 2025-04-08 NOTE — TELEPHONE ENCOUNTER
LOV 3/31/2025  BMI>32  - may be contributing to the above  - if her lab results are noncontributory, a shared decision was made to try phentermine in conjunction with her healthy lifestyle modifications. We discussed the R/B/A of this medication  - we discussed that she would be asked to follow-up in 1 month if we proceed with starting phentermine     Labs completed on 4/3/2025.   Labs stable, except low Vitamin D.     Please advise.

## 2025-04-08 NOTE — TELEPHONE ENCOUNTER
Approved for Phentermine    Prior authorization approved  Payer: [employer] Case ID: [id number]    [phone]    [phone]  Note from payer: CaseId:[id number];Status:Approved;Review Type:Prior Auth;Coverage Start Date:03/09/2025;Coverage End Date:07/07/2025;  Approval Details    Authorized from March 9, 2025 to July 7, 2025

## 2025-05-15 ENCOUNTER — OFFICE VISIT (OUTPATIENT)
Dept: FAMILY MEDICINE CLINIC | Facility: CLINIC | Age: 35
End: 2025-05-15
Payer: COMMERCIAL

## 2025-05-15 VITALS
HEART RATE: 82 BPM | WEIGHT: 225.13 LBS | BODY MASS INDEX: 31.52 KG/M2 | SYSTOLIC BLOOD PRESSURE: 110 MMHG | HEIGHT: 71 IN | TEMPERATURE: 98 F | RESPIRATION RATE: 16 BRPM | DIASTOLIC BLOOD PRESSURE: 70 MMHG

## 2025-05-15 DIAGNOSIS — R42 ORTHOSTATIC DIZZINESS: ICD-10-CM

## 2025-05-15 DIAGNOSIS — E55.9 VITAMIN D DEFICIENCY: ICD-10-CM

## 2025-05-15 DIAGNOSIS — E66.811 CLASS 1 OBESITY DUE TO EXCESS CALORIES WITHOUT SERIOUS COMORBIDITY WITH BODY MASS INDEX (BMI) OF 32.0 TO 32.9 IN ADULT: Primary | ICD-10-CM

## 2025-05-15 DIAGNOSIS — E66.09 CLASS 1 OBESITY DUE TO EXCESS CALORIES WITHOUT SERIOUS COMORBIDITY WITH BODY MASS INDEX (BMI) OF 32.0 TO 32.9 IN ADULT: Primary | ICD-10-CM

## 2025-05-15 PROCEDURE — 99214 OFFICE O/P EST MOD 30 MIN: CPT | Performed by: STUDENT IN AN ORGANIZED HEALTH CARE EDUCATION/TRAINING PROGRAM

## 2025-05-15 RX ORDER — PHENTERMINE HYDROCHLORIDE 15 MG/1
15 CAPSULE ORAL EVERY MORNING
Qty: 30 CAPSULE | Refills: 0 | Status: SHIPPED | OUTPATIENT
Start: 2025-06-13 | End: 2025-07-13

## 2025-05-15 RX ORDER — PHENTERMINE HYDROCHLORIDE 15 MG/1
15 CAPSULE ORAL EVERY MORNING
Qty: 30 CAPSULE | Refills: 0 | Status: SHIPPED | OUTPATIENT
Start: 2025-07-11 | End: 2025-08-10

## 2025-05-15 RX ORDER — PHENTERMINE HYDROCHLORIDE 15 MG/1
15 CAPSULE ORAL EVERY MORNING
COMMUNITY

## 2025-05-15 RX ORDER — PHENTERMINE HYDROCHLORIDE 15 MG/1
15 CAPSULE ORAL EVERY MORNING
Qty: 30 CAPSULE | Refills: 0 | Status: SHIPPED | OUTPATIENT
Start: 2025-05-15

## 2025-05-15 NOTE — PROGRESS NOTES
Subjective:        Chief Complaint   Patient presents with    Weight Check     Taking phentermine 15mg     HISTORY OF PRESENT ILLNESS  HPI  HPI obtained per patient report.  Berna Lopez is a pleasant 34 year old female presenting for follow-up.   She has been tolerating phentermine well. She has noticed brief LH when she stands up from a sitting position, but she denies any other symptoms since starting this medication. Her knee pain has actually improved since starting this medication as well. She feels that phentermine has been helping with her energy levels and appetite modulation.     PAST PATIENT HISTORY  Past Medical History[1]  Past Surgical History[2]    CURRENT MEDICATIONS  Medications Taking[3]    HEALTH MAINTENANCE  Immunization History   Administered Date(s) Administered    Covid-19 Vaccine Pfizer 30 mcg/0.3 ml 11/10/2021    Covid-19 Vaccine Pfizer Bivalent 30mcg/0.3mL 10/07/2022    FLUZONE 6 months and older PFS 0.5 ml (93792) 10/26/2021, 10/07/2022, 02/07/2024    MMR 07/13/2023    TDAP 04/28/2023       ALLERGIES AND DRUG REACTIONS  Allergies[4]    Family History[5]  Short Social Hx on File[6]    Review of Systems   All other systems reviewed and are negative.         Objective:      /70   Pulse 82   Temp 97.6 °F (36.4 °C) (Temporal)   Resp 16   Ht 5' 11\" (1.803 m)   Wt 225 lb 2 oz (102.1 kg)   LMP 05/06/2025 (Exact Date)   BMI 31.40 kg/m²   Body mass index is 31.4 kg/m².    Physical Exam  Vitals reviewed.   Constitutional:       General: She is not in acute distress.     Appearance: She is not ill-appearing, toxic-appearing or diaphoretic.   Cardiovascular:      Rate and Rhythm: Normal rate and regular rhythm.      Heart sounds: Normal heart sounds.   Pulmonary:      Effort: Pulmonary effort is normal.      Breath sounds: Normal breath sounds.   Abdominal:      General: There is no distension.   Musculoskeletal:      Cervical back: Neck supple.      Right lower leg: No edema.       Left lower leg: No edema.   Neurological:      Mental Status: She is alert and oriented to person, place, and time.   Psychiatric:         Mood and Affect: Mood normal.            Assessment and Plan:      1. Class 1 obesity due to excess calories without serious comorbidity with body mass index (BMI) of 32.0 to 32.9 in adult (Primary)  -     Phentermine HCl; Take 1 capsule (15 mg total) by mouth every morning.  Dispense: 30 capsule; Refill: 0  -     Phentermine HCl; Take 1 capsule (15 mg total) by mouth every morning.  Dispense: 30 capsule; Refill: 0  -     Phentermine HCl; Take 1 capsule (15 mg total) by mouth every morning.  Dispense: 30 capsule; Refill: 0  2. Body mass index (BMI) 32.0-32.9, adult  -     Phentermine HCl; Take 1 capsule (15 mg total) by mouth every morning.  Dispense: 30 capsule; Refill: 0  -     Phentermine HCl; Take 1 capsule (15 mg total) by mouth every morning.  Dispense: 30 capsule; Refill: 0  -     Phentermine HCl; Take 1 capsule (15 mg total) by mouth every morning.  Dispense: 30 capsule; Refill: 0  3. Orthostatic dizziness  4. Vitamin D deficiency    Return in about 3 months (around 8/15/2025) for follow-up.    BMI>32  - tolerating phentermine fairly well  - a shared decision was made to continue her current dose.  Her prescriptions were refilled for another 3 months  - we discussed that her orthostatic dizziness is likely due to dehydration, and she confirms that she does not drink enough water. Recommended drinking at least 64 of water daily    Vitamin D deficiency  - her lab results were WNL other than mild vitamin D deficiency  - she has been recommended OTC vitamin D supplementation at 2000 units daily  - recommend annual monitoring at this time     Patient verbalized understanding of assessment and recommendations. All questions and concerns were addressed.    Electronically signed by Danna Renteria MD         [1]   Past Medical History:   Essential hypertension    Gestational  hypertension - since resolved    Gestational hypertension, third trimester (HCC)    Onychocryptosis    Vitamin D deficiency   [2]   Past Surgical History:  Procedure Laterality Date      2023   [3]   Outpatient Medications Marked as Taking for the 5/15/25 encounter (Office Visit) with Danna Renteria MD   Medication Sig Dispense Refill    Phentermine HCl 15 MG Oral Cap Take 1 capsule (15 mg total) by mouth every morning.      Phentermine HCl 15 MG Oral Cap Take 1 capsule (15 mg total) by mouth every morning. 30 capsule 0    [START ON 2025] Phentermine HCl 15 MG Oral Cap Take 1 capsule (15 mg total) by mouth every morning. 30 capsule 0    [START ON 2025] Phentermine HCl 15 MG Oral Cap Take 1 capsule (15 mg total) by mouth every morning. 30 capsule 0   [4] No Known Allergies  [5]   Family History  Problem Relation Age of Onset    Heart Disorder Father     Hypertension Mother     Cancer Paternal Grandmother     Diabetes Paternal Grandmother     Heart Disorder Paternal Grandmother    [6]   Social History  Socioeconomic History    Marital status:    Tobacco Use    Smoking status: Never    Smokeless tobacco: Never   Vaping Use    Vaping status: Never Used   Substance and Sexual Activity    Alcohol use: Not Currently    Drug use: Not Currently     Types: Cannabis    Sexual activity: Not Currently   Other Topics Concern    Caffeine Concern No    Stress Concern No    Weight Concern No    Special Diet No    Exercise Yes    Seat Belt Yes   Social History Narrative    ** Merged History Encounter **          Social Drivers of Health     Food Insecurity: No Food Insecurity (7/10/2023)    Food Insecurity     Food Insecurity: Never true   Transportation Needs: No Transportation Needs (7/10/2023)    Transportation Needs     Lack of Transportation: No   Stress: No Stress Concern Present (7/10/2023)    Stress     Feeling of Stress : No   Housing Stability: Low Risk  (7/10/2023)    Housing Stability     Housing  Instability: No

## 2025-06-21 DIAGNOSIS — E66.811 CLASS 1 OBESITY DUE TO EXCESS CALORIES WITHOUT SERIOUS COMORBIDITY WITH BODY MASS INDEX (BMI) OF 32.0 TO 32.9 IN ADULT: ICD-10-CM

## 2025-06-21 DIAGNOSIS — E66.09 CLASS 1 OBESITY DUE TO EXCESS CALORIES WITHOUT SERIOUS COMORBIDITY WITH BODY MASS INDEX (BMI) OF 32.0 TO 32.9 IN ADULT: ICD-10-CM

## 2025-06-23 RX ORDER — PHENTERMINE HYDROCHLORIDE 15 MG/1
15 CAPSULE ORAL EVERY MORNING
Qty: 30 CAPSULE | Refills: 0 | OUTPATIENT
Start: 2025-06-23 | End: 2025-07-23

## 2025-07-29 ENCOUNTER — TELEPHONE (OUTPATIENT)
Dept: FAMILY MEDICINE CLINIC | Facility: CLINIC | Age: 35
End: 2025-07-29